# Patient Record
Sex: FEMALE | Race: WHITE | Employment: OTHER | URBAN - METROPOLITAN AREA
[De-identification: names, ages, dates, MRNs, and addresses within clinical notes are randomized per-mention and may not be internally consistent; named-entity substitution may affect disease eponyms.]

---

## 2018-09-05 ENCOUNTER — HOSPITAL ENCOUNTER (OUTPATIENT)
Dept: GENERAL RADIOLOGY | Age: 83
Discharge: HOME | End: 2018-09-05

## 2018-09-05 DIAGNOSIS — M17.12 ARTHRITIS OF KNEE, LEFT: ICD-10-CM

## 2019-08-13 ENCOUNTER — HOSPITAL ENCOUNTER (OUTPATIENT)
Dept: GENERAL RADIOLOGY | Age: 84
Discharge: HOME | End: 2019-08-13
Payer: MEDICARE

## 2019-08-13 ENCOUNTER — HOSPITAL ENCOUNTER (OUTPATIENT)
Age: 84
Discharge: HOME | End: 2019-08-13
Payer: MEDICARE

## 2019-08-13 DIAGNOSIS — M17.11 OSTEOARTHRITIS OF RIGHT KNEE, UNSPECIFIED OSTEOARTHRITIS TYPE: ICD-10-CM

## 2019-08-13 PROCEDURE — 73560 X-RAY EXAM OF KNEE 1 OR 2: CPT

## 2023-02-07 ENCOUNTER — HOSPITAL ENCOUNTER (OUTPATIENT)
Age: 88
Setting detail: SPECIMEN
Discharge: HOME | End: 2023-02-07
Payer: MEDICARE

## 2023-02-07 PROCEDURE — 81001 URINALYSIS AUTO W/SCOPE: CPT

## 2023-02-07 PROCEDURE — 87086 URINE CULTURE/COLONY COUNT: CPT

## 2023-02-08 LAB
BACTERIA: NEGATIVE /HPF
BILIRUBIN URINE: NEGATIVE MG/DL
BLOOD, URINE: NEGATIVE
CLARITY: CLEAR
COLOR: YELLOW
GLUCOSE, URINE: NEGATIVE MG/DL
HYALINE CASTS: 0 /LPF
KETONES, URINE: NEGATIVE MG/DL
LEUKOCYTE ESTERASE, URINE: ABNORMAL
MUCUS: ABNORMAL HPF
NITRITE URINE, QUANTITATIVE: NEGATIVE
PH, URINE: 6 (ref 5–8)
PROTEIN UA: NEGATIVE MG/DL
RBC URINE: 3 /HPF (ref 0–6)
SPECIFIC GRAVITY UA: 1.01 (ref 1–1.03)
SQUAMOUS EPITHELIAL: 6 /HPF
UROBILINOGEN, URINE: 0.2 MG/DL (ref 0.2–1)
WBC UA: 36 /HPF (ref 0–5)

## 2023-02-09 LAB
CULTURE: NORMAL
Lab: NORMAL
SPECIMEN: NORMAL

## 2023-02-27 PROBLEM — R91.8 LUNG MASS: Status: ACTIVE | Noted: 2023-02-27

## 2023-03-10 ENCOUNTER — HOSPITAL ENCOUNTER (OUTPATIENT)
Dept: PET IMAGING | Age: 88
Discharge: HOME | End: 2023-03-10
Payer: MEDICARE

## 2023-03-10 DIAGNOSIS — R91.1 LUNG NODULE: ICD-10-CM

## 2023-03-10 PROCEDURE — 3430000000 HC RX DIAGNOSTIC RADIOPHARMACEUTICAL: Performed by: INTERNAL MEDICINE

## 2023-03-10 PROCEDURE — 2580000003 HC RX 258: Performed by: INTERNAL MEDICINE

## 2023-03-10 PROCEDURE — A9552 F18 FDG: HCPCS | Performed by: INTERNAL MEDICINE

## 2023-03-10 PROCEDURE — 78815 PET IMAGE W/CT SKULL-THIGH: CPT

## 2023-03-10 RX ORDER — SODIUM CHLORIDE 0.9 % (FLUSH) 0.9 %
10 SYRINGE (ML) INJECTION PRN
Status: COMPLETED | OUTPATIENT
Start: 2023-03-10 | End: 2023-03-10

## 2023-03-10 RX ORDER — FLUDEOXYGLUCOSE F 18 200 MCI/ML
15.79 INJECTION, SOLUTION INTRAVENOUS
Status: COMPLETED | OUTPATIENT
Start: 2023-03-10 | End: 2023-03-10

## 2023-03-10 RX ADMIN — FLUDEOXYGLUCOSE F 18 15.79 MILLICURIE: 200 INJECTION, SOLUTION INTRAVENOUS at 12:42

## 2023-03-10 RX ADMIN — SODIUM CHLORIDE, PRESERVATIVE FREE 10 ML: 5 INJECTION INTRAVENOUS at 12:42

## 2023-08-21 ENCOUNTER — HOSPITAL ENCOUNTER (OUTPATIENT)
Dept: GENERAL RADIOLOGY | Age: 88
Discharge: HOME | End: 2023-08-21
Payer: MEDICARE

## 2023-08-21 ENCOUNTER — HOSPITAL ENCOUNTER (OUTPATIENT)
Age: 88
Discharge: HOME | End: 2023-08-21
Payer: MEDICARE

## 2023-08-21 DIAGNOSIS — R10.817 GENERALIZED ABDOMINAL TENDERNESS, REBOUND TENDERNESS PRESENCE NOT SPECIFIED: ICD-10-CM

## 2023-08-21 PROCEDURE — 74022 RADEX COMPL AQT ABD SERIES: CPT

## 2023-09-09 ENCOUNTER — APPOINTMENT (OUTPATIENT)
Dept: CT IMAGING | Age: 88
End: 2023-09-09
Payer: MEDICARE

## 2023-09-09 ENCOUNTER — HOSPITAL ENCOUNTER (EMERGENCY)
Age: 88
Discharge: HOME | End: 2023-09-09
Attending: EMERGENCY MEDICINE
Payer: MEDICARE

## 2023-09-09 VITALS
HEART RATE: 80 BPM | TEMPERATURE: 98.1 F | SYSTOLIC BLOOD PRESSURE: 153 MMHG | OXYGEN SATURATION: 96 % | DIASTOLIC BLOOD PRESSURE: 69 MMHG | RESPIRATION RATE: 18 BRPM

## 2023-09-09 DIAGNOSIS — N39.0 URINARY TRACT INFECTION WITHOUT HEMATURIA, SITE UNSPECIFIED: Primary | ICD-10-CM

## 2023-09-09 DIAGNOSIS — S22.088A OTHER CLOSED FRACTURE OF TWELFTH THORACIC VERTEBRA, INITIAL ENCOUNTER (HCC): ICD-10-CM

## 2023-09-09 LAB
ALBUMIN SERPL-MCNC: 4.4 GM/DL (ref 3.4–5)
ALP BLD-CCNC: 127 IU/L (ref 40–129)
ALT SERPL-CCNC: 7 U/L (ref 10–40)
ANION GAP SERPL CALCULATED.3IONS-SCNC: 12 MMOL/L (ref 4–16)
AST SERPL-CCNC: 16 IU/L (ref 15–37)
BACTERIA: ABNORMAL /HPF
BASOPHILS ABSOLUTE: 0.1 K/CU MM
BASOPHILS RELATIVE PERCENT: 0.8 % (ref 0–1)
BILIRUB SERPL-MCNC: 0.6 MG/DL (ref 0–1)
BILIRUBIN URINE: NEGATIVE MG/DL
BLOOD, URINE: NEGATIVE
BUN SERPL-MCNC: 21 MG/DL (ref 6–23)
CALCIUM SERPL-MCNC: 9.9 MG/DL (ref 8.3–10.6)
CAST TYPE: ABNORMAL /HPF
CHLORIDE BLD-SCNC: 101 MMOL/L (ref 99–110)
CLARITY: ABNORMAL
CO2: 25 MMOL/L (ref 21–32)
COLOR: YELLOW
CREAT SERPL-MCNC: 1 MG/DL (ref 0.6–1.1)
CRYSTAL TYPE: NEGATIVE /HPF
DIFFERENTIAL TYPE: ABNORMAL
EOSINOPHILS ABSOLUTE: 0.2 K/CU MM
EOSINOPHILS RELATIVE PERCENT: 2 % (ref 0–3)
EPITHELIAL CELLS, UA: 10 /HPF
GFR SERPL CREATININE-BSD FRML MDRD: 53 ML/MIN/1.73M2
GLUCOSE SERPL-MCNC: 131 MG/DL (ref 70–99)
GLUCOSE, URINE: NEGATIVE MG/DL
HCT VFR BLD CALC: 37.5 % (ref 37–47)
HEMOGLOBIN: 11.8 GM/DL (ref 12.5–16)
IMMATURE NEUTROPHIL %: 0.3 % (ref 0–0.43)
KETONES, URINE: NEGATIVE MG/DL
LACTATE: 1.2 MMOL/L (ref 0.5–1.9)
LEUKOCYTE ESTERASE, URINE: ABNORMAL
LYMPHOCYTES ABSOLUTE: 1.2 K/CU MM
LYMPHOCYTES RELATIVE PERCENT: 12.1 % (ref 24–44)
MCH RBC QN AUTO: 29.6 PG (ref 27–31)
MCHC RBC AUTO-ENTMCNC: 31.5 % (ref 32–36)
MCV RBC AUTO: 94.2 FL (ref 78–100)
MONOCYTES ABSOLUTE: 1.2 K/CU MM
MONOCYTES RELATIVE PERCENT: 12.2 % (ref 0–4)
NITRITE URINE, QUANTITATIVE: NEGATIVE
PDW BLD-RTO: 15.1 % (ref 11.7–14.9)
PH, URINE: 7.5 (ref 5–8)
PLATELET # BLD: 365 K/CU MM (ref 140–440)
PMV BLD AUTO: 11.8 FL (ref 7.5–11.1)
POTASSIUM SERPL-SCNC: 4.3 MMOL/L (ref 3.5–5.1)
PROTEIN UA: NEGATIVE MG/DL
RBC # BLD: 3.98 M/CU MM (ref 4.2–5.4)
RBC URINE: NEGATIVE /HPF (ref 0–6)
SEGMENTED NEUTROPHILS ABSOLUTE COUNT: 6.9 K/CU MM
SEGMENTED NEUTROPHILS RELATIVE PERCENT: 72.6 % (ref 36–66)
SODIUM BLD-SCNC: 138 MMOL/L (ref 135–145)
SPECIFIC GRAVITY UA: 1.01 (ref 1–1.03)
TOTAL IMMATURE NEUTOROPHIL: 0.03 K/CU MM
TOTAL PROTEIN: 8.1 GM/DL (ref 6.4–8.2)
UROBILINOGEN, URINE: 1 MG/DL (ref 0.2–1)
WBC # BLD: 9.5 K/CU MM (ref 4–10.5)
WBC UA: 25 /HPF (ref 0–5)

## 2023-09-09 PROCEDURE — 83605 ASSAY OF LACTIC ACID: CPT

## 2023-09-09 PROCEDURE — 74176 CT ABD & PELVIS W/O CONTRAST: CPT

## 2023-09-09 PROCEDURE — 6370000000 HC RX 637 (ALT 250 FOR IP): Performed by: EMERGENCY MEDICINE

## 2023-09-09 PROCEDURE — 87086 URINE CULTURE/COLONY COUNT: CPT

## 2023-09-09 PROCEDURE — 80053 COMPREHEN METABOLIC PANEL: CPT

## 2023-09-09 PROCEDURE — 85025 COMPLETE CBC W/AUTO DIFF WBC: CPT

## 2023-09-09 PROCEDURE — 99283 EMERGENCY DEPT VISIT LOW MDM: CPT

## 2023-09-09 PROCEDURE — 81001 URINALYSIS AUTO W/SCOPE: CPT

## 2023-09-09 RX ORDER — CEPHALEXIN 250 MG/1
500 CAPSULE ORAL ONCE
Status: COMPLETED | OUTPATIENT
Start: 2023-09-09 | End: 2023-09-09

## 2023-09-09 RX ORDER — CEPHALEXIN 500 MG/1
500 CAPSULE ORAL 2 TIMES DAILY
Qty: 14 CAPSULE | Refills: 0 | Status: SHIPPED | OUTPATIENT
Start: 2023-09-09 | End: 2023-09-12

## 2023-09-09 RX ORDER — ACETAMINOPHEN 500 MG
1000 TABLET ORAL ONCE
Status: COMPLETED | OUTPATIENT
Start: 2023-09-09 | End: 2023-09-09

## 2023-09-09 RX ADMIN — CEPHALEXIN 500 MG: 250 CAPSULE ORAL at 05:35

## 2023-09-09 RX ADMIN — ACETAMINOPHEN 1000 MG: 500 TABLET ORAL at 02:10

## 2023-09-09 ASSESSMENT — PAIN DESCRIPTION - ORIENTATION
ORIENTATION: LOWER
ORIENTATION: LOWER

## 2023-09-09 ASSESSMENT — PAIN - FUNCTIONAL ASSESSMENT
PAIN_FUNCTIONAL_ASSESSMENT: ACTIVITIES ARE NOT PREVENTED
PAIN_FUNCTIONAL_ASSESSMENT: 0-10
PAIN_FUNCTIONAL_ASSESSMENT: ACTIVITIES ARE NOT PREVENTED

## 2023-09-09 ASSESSMENT — PAIN DESCRIPTION - LOCATION
LOCATION: BACK
LOCATION: BACK

## 2023-09-09 NOTE — ED NOTES
PT awaiting CT results. Denies needs@ this time.       Viri Penny, BRITTNEY  09/09/23 9041 Started on Rocephin 1g IV q24h in ER  Will continue on admission

## 2023-09-09 NOTE — ED PROVIDER NOTES
Triage Chief Complaint:   Back Pain (Pt c/o back pain x 3 weeks)    Stebbins:  Diomedes Hernández is a 80 y.o. female that presents with left-sided back pain and urinary frequency arriving by EMS. The patient states over the past few weeks she has had some dull pain to her left lower back, worse with sitting and lying down and better with standing but pain is worsened over the last day and she has now developed urinary frequency. No dysuria or hematuria. She has not had fever, nausea, vomiting, diarrhea, abdominal pain or chest pain. States that she has had lower back pain for years but never this severe. ROS:  At least 10 systems reviewed and otherwise acutely negative except as in the [address] Past Medical History:   Diagnosis Date    Hypertension      History reviewed. No pertinent surgical history. History reviewed. No pertinent family history. Social History     Socioeconomic History    Marital status:       Spouse name: Not on file    Number of children: Not on file    Years of education: Not on file    Highest education level: Not on file   Occupational History    Not on file   Tobacco Use    Smoking status: Former     Packs/day: 1.00     Years: 50.00     Additional pack years: 0.00     Total pack years: 50.00     Types: Cigarettes     Quit date: 1997     Years since quittin.7    Smokeless tobacco: Never   Substance and Sexual Activity    Alcohol use: Not on file    Drug use: Not on file    Sexual activity: Not on file   Other Topics Concern    Not on file   Social History Narrative    Not on file     Social Determinants of Health     Financial Resource Strain: Not on file   Food Insecurity: Not on file   Transportation Needs: Not on file   Physical Activity: Not on file   Stress: Not on file   Social Connections: Not on file   Intimate Partner Violence: Not on file   Housing Stability: Not on file     Current Facility-Administered Medications   Medication Dose Route Frequency Provider Last

## 2023-09-10 LAB
CULTURE: NORMAL
Lab: NORMAL
SPECIMEN: NORMAL

## 2023-09-12 ENCOUNTER — TELEPHONE (OUTPATIENT)
Dept: PHARMACY | Age: 88
End: 2023-09-12

## 2023-09-12 NOTE — PROGRESS NOTES
Pharmacy Note  ED Culture Follow-up    Lana Humphrey is a 80 y.o. female. Allergies: Erythromycin, Influenza vaccines, Penicillins, and Sulfa antibiotics     Labs:  Lab Results   Component Value Date    BUN 21 09/09/2023    CREATININE 1.0 09/09/2023    WBC 9.5 09/09/2023     CrCl cannot be calculated (Unknown ideal weight.). Current antimicrobials:   Cephalexin 500 mg by mouth twice daily for 7 days     ASSESSMENT:  Micro results:   Urine culture <50,000 CFU/ml mixed skin/urogenital oneida     PLAN:  Need for intervention: Yes  Discussed with: Dr. Walter Hodge treatment:    Informed patient of negative urine culture and instructed patient to discontinue cephalexin. Patient response:   Called and spoke with patient. Counseled patient on following up with PCP    Called/sent in prescription to: Not applicable    Please call with any questions.  Meena Dunham [facility], PharmD 4:03 PM 9/12/2023

## 2023-09-12 NOTE — TELEPHONE ENCOUNTER
Pharmacy Note  ED Culture Follow-up    Brent Mcqueen is a 80 y.o. female. Allergies: Erythromycin, Influenza vaccines, Penicillins, and Sulfa antibiotics     Labs:  Lab Results   Component Value Date    BUN 21 09/09/2023    CREATININE 1.0 09/09/2023    WBC 9.5 09/09/2023     CrCl cannot be calculated (Unknown ideal weight.). Current antimicrobials:   Cephalexin 500 mg by mouth twice daily for 7 days     ASSESSMENT:  Micro results:   Urine culture <50,000 CFU/ml mixed skin/urogenital oneida     PLAN:  Need for intervention: Yes  Discussed with: Dr. Fito Torres treatment:    Informed patient of negative urine culture and instructed patient to discontinue cephalexin. Patient response:   Called and spoke with patient. Counseled patient on following up with PCP    Called/sent in prescription to: Not applicable    Please call with any questions.  Ya Harry [facility], PharmD 4:03 PM 9/12/2023

## 2023-09-18 ENCOUNTER — TRANSCRIBE ORDERS (OUTPATIENT)
Dept: ADMINISTRATIVE | Age: 88
End: 2023-09-18

## 2023-09-18 DIAGNOSIS — C34.91 MALIGNANT NEOPLASM OF RIGHT LUNG, UNSPECIFIED PART OF LUNG (HCC): Primary | ICD-10-CM

## 2023-09-28 ENCOUNTER — HOSPITAL ENCOUNTER (OUTPATIENT)
Dept: PET IMAGING | Age: 88
Discharge: HOME | End: 2023-09-28
Attending: INTERNAL MEDICINE
Payer: MEDICARE

## 2023-09-28 DIAGNOSIS — C34.91 MALIGNANT NEOPLASM OF RIGHT LUNG, UNSPECIFIED PART OF LUNG (HCC): ICD-10-CM

## 2023-09-28 PROCEDURE — 3430000000 HC RX DIAGNOSTIC RADIOPHARMACEUTICAL: Performed by: INTERNAL MEDICINE

## 2023-09-28 PROCEDURE — A9552 F18 FDG: HCPCS | Performed by: INTERNAL MEDICINE

## 2023-09-28 PROCEDURE — 2580000003 HC RX 258: Performed by: INTERNAL MEDICINE

## 2023-09-28 PROCEDURE — 78815 PET IMAGE W/CT SKULL-THIGH: CPT

## 2023-09-28 RX ORDER — SODIUM CHLORIDE 0.9 % (FLUSH) 0.9 %
10 SYRINGE (ML) INJECTION PRN
Status: COMPLETED | OUTPATIENT
Start: 2023-09-28 | End: 2023-09-28

## 2023-09-28 RX ORDER — FLUDEOXYGLUCOSE F 18 200 MCI/ML
15.22 INJECTION, SOLUTION INTRAVENOUS
Status: COMPLETED | OUTPATIENT
Start: 2023-09-28 | End: 2023-09-28

## 2023-09-28 RX ADMIN — FLUDEOXYGLUCOSE F 18 15.22 MILLICURIE: 200 INJECTION, SOLUTION INTRAVENOUS at 13:14

## 2023-09-28 RX ADMIN — SODIUM CHLORIDE, PRESERVATIVE FREE 10 ML: 5 INJECTION INTRAVENOUS at 13:14

## 2023-12-08 ENCOUNTER — APPOINTMENT (OUTPATIENT)
Dept: CT IMAGING | Age: 88
End: 2023-12-08
Payer: MEDICARE

## 2023-12-08 ENCOUNTER — APPOINTMENT (OUTPATIENT)
Dept: GENERAL RADIOLOGY | Age: 88
End: 2023-12-08
Payer: MEDICARE

## 2023-12-08 ENCOUNTER — HOSPITAL ENCOUNTER (INPATIENT)
Age: 88
LOS: 7 days | Discharge: OTHER INSTITUTION | End: 2023-12-15
Attending: STUDENT IN AN ORGANIZED HEALTH CARE EDUCATION/TRAINING PROGRAM | Admitting: STUDENT IN AN ORGANIZED HEALTH CARE EDUCATION/TRAINING PROGRAM
Payer: MEDICARE

## 2023-12-08 DIAGNOSIS — B88.8 INFESTATION BY BED BUG: ICD-10-CM

## 2023-12-08 DIAGNOSIS — R26.2 AMBULATORY DYSFUNCTION: ICD-10-CM

## 2023-12-08 DIAGNOSIS — R91.8 LUNG MASS: Primary | ICD-10-CM

## 2023-12-08 DIAGNOSIS — M84.58XG PATHOLOGICAL FRACTURE OF VERTEBRA DUE TO NEOPLASTIC DISEASE WITH DELAYED HEALING, SUBSEQUENT ENCOUNTER: ICD-10-CM

## 2023-12-08 PROBLEM — R53.1 GENERALIZED WEAKNESS: Status: ACTIVE | Noted: 2023-12-08

## 2023-12-08 LAB
ALBUMIN SERPL-MCNC: 3.7 GM/DL (ref 3.4–5)
ALP BLD-CCNC: 131 IU/L (ref 40–129)
ALT SERPL-CCNC: 9 U/L (ref 10–40)
ANION GAP SERPL CALCULATED.3IONS-SCNC: 14 MMOL/L (ref 4–16)
AST SERPL-CCNC: 22 IU/L (ref 15–37)
BASOPHILS ABSOLUTE: 0.1 K/CU MM
BASOPHILS RELATIVE PERCENT: 0.5 % (ref 0–1)
BILIRUB SERPL-MCNC: 0.8 MG/DL (ref 0–1)
BUN SERPL-MCNC: 28 MG/DL (ref 6–23)
CALCIUM SERPL-MCNC: 9.6 MG/DL (ref 8.3–10.6)
CHLORIDE BLD-SCNC: 100 MMOL/L (ref 99–110)
CO2: 25 MMOL/L (ref 21–32)
CREAT SERPL-MCNC: 1.1 MG/DL (ref 0.6–1.1)
DIFFERENTIAL TYPE: ABNORMAL
EOSINOPHILS ABSOLUTE: 0 K/CU MM
EOSINOPHILS RELATIVE PERCENT: 0.1 % (ref 0–3)
GFR SERPL CREATININE-BSD FRML MDRD: 47 ML/MIN/1.73M2
GLUCOSE SERPL-MCNC: 107 MG/DL (ref 70–99)
HCT VFR BLD CALC: 36.8 % (ref 37–47)
HEMOGLOBIN: 11.5 GM/DL (ref 12.5–16)
IMMATURE NEUTROPHIL %: 0.5 % (ref 0–0.43)
LYMPHOCYTES ABSOLUTE: 0.7 K/CU MM
LYMPHOCYTES RELATIVE PERCENT: 5 % (ref 24–44)
MCH RBC QN AUTO: 27.8 PG (ref 27–31)
MCHC RBC AUTO-ENTMCNC: 31.3 % (ref 32–36)
MCV RBC AUTO: 89.1 FL (ref 78–100)
MONOCYTES ABSOLUTE: 1.6 K/CU MM
MONOCYTES RELATIVE PERCENT: 11.2 % (ref 0–4)
NUCLEATED RBC %: 0 %
PDW BLD-RTO: 14.5 % (ref 11.7–14.9)
PLATELET # BLD: 360 K/CU MM (ref 140–440)
PMV BLD AUTO: 11.6 FL (ref 7.5–11.1)
POTASSIUM SERPL-SCNC: 3.5 MMOL/L (ref 3.5–5.1)
RBC # BLD: 4.13 M/CU MM (ref 4.2–5.4)
SEGMENTED NEUTROPHILS ABSOLUTE COUNT: 11.9 K/CU MM
SEGMENTED NEUTROPHILS RELATIVE PERCENT: 82.7 % (ref 36–66)
SODIUM BLD-SCNC: 139 MMOL/L (ref 135–145)
TOTAL IMMATURE NEUTOROPHIL: 0.07 K/CU MM
TOTAL NUCLEATED RBC: 0 K/CU MM
TOTAL PROTEIN: 7.3 GM/DL (ref 6.4–8.2)
WBC # BLD: 14.3 K/CU MM (ref 4–10.5)

## 2023-12-08 PROCEDURE — 6360000002 HC RX W HCPCS: Performed by: STUDENT IN AN ORGANIZED HEALTH CARE EDUCATION/TRAINING PROGRAM

## 2023-12-08 PROCEDURE — 1200000000 HC SEMI PRIVATE

## 2023-12-08 PROCEDURE — 6370000000 HC RX 637 (ALT 250 FOR IP): Performed by: STUDENT IN AN ORGANIZED HEALTH CARE EDUCATION/TRAINING PROGRAM

## 2023-12-08 PROCEDURE — 73562 X-RAY EXAM OF KNEE 3: CPT

## 2023-12-08 PROCEDURE — 70450 CT HEAD/BRAIN W/O DYE: CPT

## 2023-12-08 PROCEDURE — 85025 COMPLETE CBC W/AUTO DIFF WBC: CPT

## 2023-12-08 PROCEDURE — 72125 CT NECK SPINE W/O DYE: CPT

## 2023-12-08 PROCEDURE — 6360000004 HC RX CONTRAST MEDICATION: Performed by: STUDENT IN AN ORGANIZED HEALTH CARE EDUCATION/TRAINING PROGRAM

## 2023-12-08 PROCEDURE — 73502 X-RAY EXAM HIP UNI 2-3 VIEWS: CPT

## 2023-12-08 PROCEDURE — 80053 COMPREHEN METABOLIC PANEL: CPT

## 2023-12-08 PROCEDURE — 2580000003 HC RX 258: Performed by: STUDENT IN AN ORGANIZED HEALTH CARE EDUCATION/TRAINING PROGRAM

## 2023-12-08 PROCEDURE — 73610 X-RAY EXAM OF ANKLE: CPT

## 2023-12-08 PROCEDURE — 76376 3D RENDER W/INTRP POSTPROCES: CPT

## 2023-12-08 PROCEDURE — 99285 EMERGENCY DEPT VISIT HI MDM: CPT

## 2023-12-08 PROCEDURE — 71260 CT THORAX DX C+: CPT

## 2023-12-08 PROCEDURE — 94761 N-INVAS EAR/PLS OXIMETRY MLT: CPT

## 2023-12-08 RX ORDER — ONDANSETRON 4 MG/1
4 TABLET, ORALLY DISINTEGRATING ORAL EVERY 8 HOURS PRN
Status: DISCONTINUED | OUTPATIENT
Start: 2023-12-08 | End: 2023-12-15 | Stop reason: HOSPADM

## 2023-12-08 RX ORDER — POLYETHYLENE GLYCOL 3350 17 G/17G
17 POWDER, FOR SOLUTION ORAL DAILY PRN
Status: DISCONTINUED | OUTPATIENT
Start: 2023-12-08 | End: 2023-12-15 | Stop reason: HOSPADM

## 2023-12-08 RX ORDER — KETOROLAC TROMETHAMINE 15 MG/ML
15 INJECTION, SOLUTION INTRAMUSCULAR; INTRAVENOUS ONCE
Status: ACTIVE | OUTPATIENT
Start: 2023-12-08 | End: 2023-12-13

## 2023-12-08 RX ORDER — PROMETHAZINE HYDROCHLORIDE 12.5 MG/1
12.5 TABLET ORAL EVERY 12 HOURS PRN
COMMUNITY
Start: 2023-11-29

## 2023-12-08 RX ORDER — MORPHINE SULFATE 20 MG/ML
5 SOLUTION ORAL
Status: DISCONTINUED | OUTPATIENT
Start: 2023-12-08 | End: 2023-12-15 | Stop reason: HOSPADM

## 2023-12-08 RX ORDER — ASPIRIN 81 MG/1
81 TABLET, CHEWABLE ORAL DAILY
Status: DISCONTINUED | OUTPATIENT
Start: 2023-12-08 | End: 2023-12-15 | Stop reason: HOSPADM

## 2023-12-08 RX ORDER — SODIUM CHLORIDE 0.9 % (FLUSH) 0.9 %
5-40 SYRINGE (ML) INJECTION EVERY 12 HOURS SCHEDULED
Status: DISCONTINUED | OUTPATIENT
Start: 2023-12-08 | End: 2023-12-15 | Stop reason: HOSPADM

## 2023-12-08 RX ORDER — GLYCOPYRROLATE 0.2 MG/ML
0.2 INJECTION INTRAMUSCULAR; INTRAVENOUS EVERY 4 HOURS PRN
Status: DISCONTINUED | OUTPATIENT
Start: 2023-12-08 | End: 2023-12-15 | Stop reason: HOSPADM

## 2023-12-08 RX ORDER — ACETAMINOPHEN 650 MG/1
650 SUPPOSITORY RECTAL EVERY 6 HOURS PRN
Status: DISCONTINUED | OUTPATIENT
Start: 2023-12-08 | End: 2023-12-15 | Stop reason: HOSPADM

## 2023-12-08 RX ORDER — ONDANSETRON 2 MG/ML
4 INJECTION INTRAMUSCULAR; INTRAVENOUS EVERY 6 HOURS PRN
Status: DISCONTINUED | OUTPATIENT
Start: 2023-12-08 | End: 2023-12-08 | Stop reason: SDUPTHER

## 2023-12-08 RX ORDER — 0.9 % SODIUM CHLORIDE 0.9 %
1000 INTRAVENOUS SOLUTION INTRAVENOUS ONCE
Status: COMPLETED | OUTPATIENT
Start: 2023-12-08 | End: 2023-12-08

## 2023-12-08 RX ORDER — HYDROCODONE BITARTRATE AND ACETAMINOPHEN 5; 325 MG/1; MG/1
1 TABLET ORAL EVERY 6 HOURS PRN
Status: ON HOLD | COMMUNITY
Start: 2023-11-28 | End: 2023-12-11 | Stop reason: HOSPADM

## 2023-12-08 RX ORDER — SODIUM CHLORIDE 0.9 % (FLUSH) 0.9 %
5-40 SYRINGE (ML) INJECTION PRN
Status: DISCONTINUED | OUTPATIENT
Start: 2023-12-08 | End: 2023-12-15 | Stop reason: HOSPADM

## 2023-12-08 RX ORDER — AMLODIPINE BESYLATE 5 MG/1
5 TABLET ORAL DAILY
Status: DISCONTINUED | OUTPATIENT
Start: 2023-12-08 | End: 2023-12-15 | Stop reason: HOSPADM

## 2023-12-08 RX ORDER — ACETAMINOPHEN 325 MG/1
650 TABLET ORAL EVERY 6 HOURS PRN
Status: DISCONTINUED | OUTPATIENT
Start: 2023-12-08 | End: 2023-12-15 | Stop reason: HOSPADM

## 2023-12-08 RX ORDER — LORAZEPAM 2 MG/ML
1 CONCENTRATE ORAL
Status: DISCONTINUED | OUTPATIENT
Start: 2023-12-08 | End: 2023-12-15 | Stop reason: HOSPADM

## 2023-12-08 RX ORDER — FLUTICASONE PROPIONATE 50 MCG
2 SPRAY, SUSPENSION (ML) NASAL DAILY PRN
COMMUNITY
Start: 2023-09-11

## 2023-12-08 RX ORDER — SODIUM CHLORIDE 9 MG/ML
INJECTION, SOLUTION INTRAVENOUS PRN
Status: DISCONTINUED | OUTPATIENT
Start: 2023-12-08 | End: 2023-12-15 | Stop reason: HOSPADM

## 2023-12-08 RX ORDER — ENOXAPARIN SODIUM 100 MG/ML
30 INJECTION SUBCUTANEOUS DAILY
Status: DISCONTINUED | OUTPATIENT
Start: 2023-12-08 | End: 2023-12-15 | Stop reason: HOSPADM

## 2023-12-08 RX ORDER — ACETAMINOPHEN 325 MG/1
650 TABLET ORAL ONCE
Status: DISCONTINUED | OUTPATIENT
Start: 2023-12-08 | End: 2023-12-15 | Stop reason: HOSPADM

## 2023-12-08 RX ORDER — ACETAMINOPHEN 500 MG
1000 TABLET ORAL
Status: COMPLETED | OUTPATIENT
Start: 2023-12-08 | End: 2023-12-08

## 2023-12-08 RX ORDER — ONDANSETRON 2 MG/ML
4 INJECTION INTRAMUSCULAR; INTRAVENOUS EVERY 6 HOURS PRN
Status: DISCONTINUED | OUTPATIENT
Start: 2023-12-08 | End: 2023-12-15 | Stop reason: HOSPADM

## 2023-12-08 RX ORDER — POLYETHYLENE GLYCOL 3350 17 G/17G
17 POWDER, FOR SOLUTION ORAL DAILY PRN
Status: DISCONTINUED | OUTPATIENT
Start: 2023-12-08 | End: 2023-12-08 | Stop reason: SDUPTHER

## 2023-12-08 RX ADMIN — SODIUM CHLORIDE 1000 ML: 9 INJECTION, SOLUTION INTRAVENOUS at 13:47

## 2023-12-08 RX ADMIN — SODIUM CHLORIDE, PRESERVATIVE FREE 10 ML: 5 INJECTION INTRAVENOUS at 21:50

## 2023-12-08 RX ADMIN — ACETAMINOPHEN 1000 MG: 500 TABLET ORAL at 18:44

## 2023-12-08 RX ADMIN — ENOXAPARIN SODIUM 30 MG: 100 INJECTION SUBCUTANEOUS at 18:44

## 2023-12-08 RX ADMIN — AMLODIPINE BESYLATE 5 MG: 5 TABLET ORAL at 18:44

## 2023-12-08 RX ADMIN — IOPAMIDOL 75 ML: 755 INJECTION, SOLUTION INTRAVENOUS at 13:18

## 2023-12-08 RX ADMIN — Medication 1 MG: at 21:50

## 2023-12-08 RX ADMIN — ASPIRIN 81 MG: 81 TABLET, CHEWABLE ORAL at 18:44

## 2023-12-08 ASSESSMENT — PAIN DESCRIPTION - LOCATION
LOCATION: BACK

## 2023-12-08 ASSESSMENT — PAIN SCALES - GENERAL
PAINLEVEL_OUTOF10: 5
PAINLEVEL_OUTOF10: 8
PAINLEVEL_OUTOF10: 5
PAINLEVEL_OUTOF10: 5
PAINLEVEL_OUTOF10: 8

## 2023-12-08 ASSESSMENT — PAIN DESCRIPTION - DESCRIPTORS
DESCRIPTORS: ACHING

## 2023-12-08 ASSESSMENT — PAIN - FUNCTIONAL ASSESSMENT: PAIN_FUNCTIONAL_ASSESSMENT: ACTIVITIES ARE NOT PREVENTED

## 2023-12-08 ASSESSMENT — PAIN DESCRIPTION - ORIENTATION
ORIENTATION: LOWER

## 2023-12-08 ASSESSMENT — PAIN DESCRIPTION - PAIN TYPE: TYPE: CHRONIC PAIN

## 2023-12-08 NOTE — ED NOTES
Hibiclens bath performed at this time. Pt belongings bagged X3. Pt jewelry in a clear bag with slip dress.       Ghassan Bowman RN  12/08/23 [id number]

## 2023-12-08 NOTE — H&P
V2.0  History and Physical      Name:  Stephan Maldonado /Age/Sex: 1932  (80 y.o. female)   MRN & CSN:  [id number] & [id number] Encounter Date/Time: 2023 3:48 PM EST   Location:  ED27/ED-27 PCP: Walden Halsted, MD       Hospital Day: 1    Assessment and Plan:   Stephan Maldonado is a 80 y.o. female with a pmh of HTN who presents with Generalized weakness    Hospital Problems             Last Modified POA    * (Principal) Generalized weakness 2023 Yes       Generalized weakness with mechanical falls, recurrent: Multiple pathological fractures and osteopenia's: worsening pathological fractures likely secondary to the known lung cancer. Started on pain medications. Fall precautions in place PT OT for placement. Social work on board. Underlying history of lung cancer: Metastatic in nature. We discussed about CODE STATUS currently patient will be full code. Oncology has been consulted  Asymptomatic bacteriuria will hold off on antibiotics  Benign essential hypertension  Leukocytosis in the setting of above  Goals of care: Discussed in detail about the CODE STATUS. Initially admitted with full code. But the CODE STATUS after extensive discussion with the patient about the lung cancer and prognosis has been changed to DNR CC with comfort care. Hospice to be consulted. To be started on comfort care medications. Discussed management of the case in detail w/ the     Comment: Please note this report has been produced using speech recognition software and may contain errors related to that system including errors in grammar, punctuation, and spelling, as well as words and phrases that may be inappropriate. If there are any questions or concerns please feel free to contact the dictating provider for clarification.      Disposition:   Current Living situation: Home  Expected Disposition: Rehab  Estimated D/C: TBD    Diet No diet orders on file   DVT Prophylaxis [] Lovenox, []  Heparin, [] SCDs, [] acute osseous abnormality involving the left hip. If the patient is acutely nonweightbearing, consider CT or MRI for further evaluation.        Personally reviewed Lab Studies, Imaging    Electronically signed by Sean Bryant MD on 12/8/2023 at 3:48 PM

## 2023-12-08 NOTE — ED NOTES
Medication History  [facility]    Patient Name: Tammy Granger 7/25/1932     Medication history has been completed by: Iris Cabezas CPhT    Source(s) of information: insurance claims      Primary Care Physician: Martir Moya MD     Pharmacy: [facility] [place]    Allergies as of 12/08/2023 - Fully Reviewed 12/08/2023   Allergen Reaction Noted    Erythromycin  08/06/2016    Influenza vaccines  08/10/2022    Penicillins  08/06/2016    Sulfa antibiotics  08/06/2016        Prior to Admission medications    Medication Sig Start Date End Date Taking? Authorizing Provider   fluticasone (FLONASE) 50 MCG/ACT nasal spray 2 sprays by Nasal route daily as needed 9/11/23   Mansi Herrera MD   promethazine (PHENERGAN) 12.5 MG tablet Take 1 tablet by mouth every 12 hours as needed 11/29/23   Mansi Herrera MD   HYDROcodone-acetaminophen (NORCO) 5-325 MG per tablet Take 1 tablet by mouth every 6 hours as needed. Max Daily Amount: 4 tablets 11/28/23   Mansi Herrera MD   ALPRAZolam (XANAX) 0.25 MG tablet TAKE 1 TABLET BY MOUTH TWICE A DAY AS NEEDED FOR 30 DAYS 2/7/23   Mansi Herrera MD   amLODIPine (NORVASC) 5 MG tablet TAKE 1 TABLET BY MOUTH EVERY DAY FOR 90 DAYS 11/30/22   Mansi Herrera MD   aspirin 81 MG chewable tablet Take 81 mg by mouth daily    Mansi Herrera MD   primidone (MYSOLINE) 50 MG tablet Take 50 mg by mouth nightly    Mansi Herrera MD     Medications added or changed (ex. new medication, dosage change, interval change, formulation change):  Fluticasone spray (added)  Norco prn (added)  Promethazine prn (added)    Comments:  Unable to assess patient. Medication list per insurance claims.     To my knowledge the above medication history is accurate as of 12/8/2023 10:31 AM.   Iris Cabezas CPhT   12/8/2023 10:31 AM

## 2023-12-08 NOTE — ED PROVIDER NOTES
[facility]  Emergency Department Encounter    Pt Name:Jo Merlin Busman  MRN: [id number]  Birthdate 1932  Date of evaluation: 23  PCP:  Viviane Parada MD       [address]       Chief Complaint   Patient presents with    Fall     Fall. Was down for about an hour. Pt not on blood thinners. Pt states she has been feeling weak for the past 24 hours and that's why she fell. Pt does c/o numbness of toes. HISTORY OF PRESENT ILLNESS     Gavin Cabrera is a 80 y.o. female who presents with mechanical fall. Patient states that she went to bend over a few hours prior to arrival when she lost her balance causing her to fall backwards striking her head on the ground. She denies loss of consciousness. She is complaining of pain all over but most significantly in her back. She states that she has had a back fracture in the past.  Does have a history of hypertension and known lung mass. PAST MEDICAL / SURGICAL / SOCIAL / FAMILY HISTORY      has a past medical history of Hypertension. has no past surgical history on file. Social History     Socioeconomic History    Marital status:       Spouse name: Not on file    Number of children: Not on file    Years of education: Not on file    Highest education level: Not on file   Occupational History    Not on file   Tobacco Use    Smoking status: Former     Packs/day: 1.00     Years: 50.00     Additional pack years: 0.00     Total pack years: 50.00     Types: Cigarettes     Quit date: 1997     Years since quittin.9    Smokeless tobacco: Never   Substance and Sexual Activity    Alcohol use: Not on file    Drug use: Not on file    Sexual activity: Not on file   Other Topics Concern    Not on file   Social History Narrative    Not on file     Social Determinants of Health     Financial Resource Strain: Not on file   Food Insecurity: Not on file   Transportation Needs: Not on RADIOLOGY:  CT LUMBAR RECONSTRUCTION WO POST PROCESS   Final Result   1. Increased size of a spiculated right upper lobe pulmonary mass with   increased invasion of the right chest wall and worsening destructive changes   of the right 3rd and 4th ribs. 2.  Multifocal ground-glass opacities of the lungs are unchanged. 3.  Small right and trace left pleural effusions with adjacent atelectasis. 4.  A lucent lesion involving the left T11 vertebral body, pedicle, lamina,   and transverse processes concerning for metastatic disease. This appears   significantly more conspicuous since 09/28/2023 PET-CT. 5.  Increased compression deformity of the T12 vertebral body resulting in   approximately 20% height loss, consistent with a pathologic fracture. CT THORACIC RECONSTRUCTION WO POST PROCESS   Final Result   1. Increased size of a spiculated right upper lobe pulmonary mass with   increased invasion of the right chest wall and worsening destructive changes   of the right 3rd and 4th ribs. 2.  Multifocal ground-glass opacities of the lungs are unchanged. 3.  Small right and trace left pleural effusions with adjacent atelectasis. 4.  A lucent lesion involving the left T11 vertebral body, pedicle, lamina,   and transverse processes concerning for metastatic disease. This appears   significantly more conspicuous since 09/28/2023 PET-CT. 5.  Increased compression deformity of the T12 vertebral body resulting in   approximately 20% height loss, consistent with a pathologic fracture. CT CHEST ABDOMEN PELVIS W CONTRAST Additional Contrast? None   Final Result   1. Increased size of a spiculated right upper lobe pulmonary mass with   increased invasion of the right chest wall and worsening destructive changes   of the right 3rd and 4th ribs. 2.  Multifocal ground-glass opacities of the lungs are unchanged.       3.  Small right and trace left pleural effusions

## 2023-12-08 NOTE — ED NOTES
Pt speaking to son, Edis Durham, on hospital phone at this time.       Jamil Acosta RN  12/08/23 [id number]

## 2023-12-08 NOTE — PROGRESS NOTES
4 Eyes Skin Assessment     NAME:  Mickey Ramires  YOB: 1932  MEDICAL RECORD NUMBER:  [id number]    The patient is being assessed for  Admission    I agree that at least one RN has performed a thorough Head to Toe Skin Assessment on the patient. ALL assessment sites listed below have been assessed. Areas assessed by both nurses:    Head, Face, Ears, Shoulders, Back, Chest, Arms, Elbows, Hands, Sacrum. Buttock, Coccyx, Ischium, and Legs. Feet and Heels        Does the Patient have a Wound? No noted wound(s)       Jesus Manuel Prevention initiated by RN: Yes  Wound Care Orders initiated by RN: No    Pressure Injury (Stage 3,4, Unstageable, DTI, NWPT, and Complex wounds) if present, place Wound referral order by RN under : No    New Ostomies, if present place, Ostomy referral order under : No     Nurse 1 eSignature: Electronically signed by Madeleine Saldivar RN on 12/8/23 at 5:59 PM EST    **SHARE this note so that the co-signing nurse can place an eSignature**    Nurse 2 eSignature: Electronically signed by Kristin Yee on 12/8/23 at 6:00 PM EST

## 2023-12-09 LAB
ANION GAP SERPL CALCULATED.3IONS-SCNC: 13 MMOL/L (ref 4–16)
BACTERIA: NEGATIVE /HPF
BILIRUBIN URINE: NEGATIVE MG/DL
BLOOD, URINE: NEGATIVE
BUN SERPL-MCNC: 23 MG/DL (ref 6–23)
CALCIUM SERPL-MCNC: 9.2 MG/DL (ref 8.3–10.6)
CHLORIDE BLD-SCNC: 100 MMOL/L (ref 99–110)
CLARITY: CLEAR
CO2: 23 MMOL/L (ref 21–32)
COLOR: YELLOW
CREAT SERPL-MCNC: 0.9 MG/DL (ref 0.6–1.1)
FERRITIN: 173 NG/ML (ref 15–150)
FOLATE SERPL-MCNC: 4.9 NG/ML (ref 3.1–17.5)
GFR SERPL CREATININE-BSD FRML MDRD: >60 ML/MIN/1.73M2
GLUCOSE SERPL-MCNC: 100 MG/DL (ref 70–99)
GLUCOSE, URINE: NEGATIVE MG/DL
HCT VFR BLD CALC: 38.2 % (ref 37–47)
HEMOGLOBIN: 11.4 GM/DL (ref 12.5–16)
IRON: 22 UG/DL (ref 37–145)
KETONES, URINE: ABNORMAL MG/DL
LEUKOCYTE ESTERASE, URINE: NEGATIVE
MAGNESIUM: 2 MG/DL (ref 1.8–2.4)
MCH RBC QN AUTO: 28.1 PG (ref 27–31)
MCHC RBC AUTO-ENTMCNC: 29.8 % (ref 32–36)
MCV RBC AUTO: 94.1 FL (ref 78–100)
MUCUS: ABNORMAL HPF
NITRITE URINE, QUANTITATIVE: NEGATIVE
PCT TRANSFERRIN: 12 % (ref 10–44)
PDW BLD-RTO: 14.6 % (ref 11.7–14.9)
PH, URINE: 5.5 (ref 5–8)
PHOSPHORUS: 2.7 MG/DL (ref 2.5–4.9)
PLATELET # BLD: 370 K/CU MM (ref 140–440)
PMV BLD AUTO: 11.7 FL (ref 7.5–11.1)
POTASSIUM SERPL-SCNC: 3.4 MMOL/L (ref 3.5–5.1)
PROTEIN UA: ABNORMAL MG/DL
RBC # BLD: 4.06 M/CU MM (ref 4.2–5.4)
RBC URINE: <1 /HPF (ref 0–6)
SODIUM BLD-SCNC: 136 MMOL/L (ref 135–145)
SPECIFIC GRAVITY UA: 1.02 (ref 1–1.03)
SQUAMOUS EPITHELIAL: 1 /HPF
TOTAL IRON BINDING CAPACITY: 186 UG/DL (ref 250–450)
TRICHOMONAS: ABNORMAL /HPF
TSH SERPL DL<=0.005 MIU/L-ACNC: 2.17 UIU/ML (ref 0.27–4.2)
UNSATURATED IRON BINDING CAPACITY: 164 UG/DL (ref 110–370)
UROBILINOGEN, URINE: 0.2 MG/DL (ref 0.2–1)
VITAMIN B-12: >2000 PG/ML (ref 211–911)
WBC # BLD: 13.9 K/CU MM (ref 4–10.5)
WBC UA: 2 /HPF (ref 0–5)

## 2023-12-09 PROCEDURE — 83550 IRON BINDING TEST: CPT

## 2023-12-09 PROCEDURE — 99222 1ST HOSP IP/OBS MODERATE 55: CPT | Performed by: INTERNAL MEDICINE

## 2023-12-09 PROCEDURE — 36415 COLL VENOUS BLD VENIPUNCTURE: CPT

## 2023-12-09 PROCEDURE — 82728 ASSAY OF FERRITIN: CPT

## 2023-12-09 PROCEDURE — 80048 BASIC METABOLIC PNL TOTAL CA: CPT

## 2023-12-09 PROCEDURE — 2580000003 HC RX 258: Performed by: STUDENT IN AN ORGANIZED HEALTH CARE EDUCATION/TRAINING PROGRAM

## 2023-12-09 PROCEDURE — 1200000000 HC SEMI PRIVATE

## 2023-12-09 PROCEDURE — 97530 THERAPEUTIC ACTIVITIES: CPT

## 2023-12-09 PROCEDURE — 84443 ASSAY THYROID STIM HORMONE: CPT

## 2023-12-09 PROCEDURE — 83540 ASSAY OF IRON: CPT

## 2023-12-09 PROCEDURE — 82607 VITAMIN B-12: CPT

## 2023-12-09 PROCEDURE — 94761 N-INVAS EAR/PLS OXIMETRY MLT: CPT

## 2023-12-09 PROCEDURE — 6360000002 HC RX W HCPCS: Performed by: STUDENT IN AN ORGANIZED HEALTH CARE EDUCATION/TRAINING PROGRAM

## 2023-12-09 PROCEDURE — 81001 URINALYSIS AUTO W/SCOPE: CPT

## 2023-12-09 PROCEDURE — 97166 OT EVAL MOD COMPLEX 45 MIN: CPT

## 2023-12-09 PROCEDURE — 85027 COMPLETE CBC AUTOMATED: CPT

## 2023-12-09 PROCEDURE — 84100 ASSAY OF PHOSPHORUS: CPT

## 2023-12-09 PROCEDURE — 83735 ASSAY OF MAGNESIUM: CPT

## 2023-12-09 PROCEDURE — 82746 ASSAY OF FOLIC ACID SERUM: CPT

## 2023-12-09 PROCEDURE — 6370000000 HC RX 637 (ALT 250 FOR IP): Performed by: STUDENT IN AN ORGANIZED HEALTH CARE EDUCATION/TRAINING PROGRAM

## 2023-12-09 RX ORDER — POTASSIUM CHLORIDE 20 MEQ/1
40 TABLET, EXTENDED RELEASE ORAL ONCE
Status: COMPLETED | OUTPATIENT
Start: 2023-12-09 | End: 2023-12-09

## 2023-12-09 RX ADMIN — POTASSIUM CHLORIDE 40 MEQ: 1500 TABLET, EXTENDED RELEASE ORAL at 17:29

## 2023-12-09 RX ADMIN — ACETAMINOPHEN 650 MG: 325 TABLET ORAL at 17:29

## 2023-12-09 RX ADMIN — ENOXAPARIN SODIUM 30 MG: 100 INJECTION SUBCUTANEOUS at 09:16

## 2023-12-09 RX ADMIN — ASPIRIN 81 MG: 81 TABLET, CHEWABLE ORAL at 09:16

## 2023-12-09 RX ADMIN — SODIUM CHLORIDE, PRESERVATIVE FREE 10 ML: 5 INJECTION INTRAVENOUS at 22:01

## 2023-12-09 RX ADMIN — SODIUM CHLORIDE, PRESERVATIVE FREE 10 ML: 5 INJECTION INTRAVENOUS at 09:16

## 2023-12-09 RX ADMIN — AMLODIPINE BESYLATE 5 MG: 5 TABLET ORAL at 09:17

## 2023-12-09 ASSESSMENT — PAIN SCALES - GENERAL
PAINLEVEL_OUTOF10: 0
PAINLEVEL_OUTOF10: 6

## 2023-12-09 ASSESSMENT — PAIN DESCRIPTION - LOCATION: LOCATION: BACK

## 2023-12-09 NOTE — PROGRESS NOTES
OT, therapy POC and functional goals, progression w/ ADLs and transfers, importance of movement and OOB activity, d/c recommendations     Safety Measures: Gait belt used, Left in bed, Alarm in place    Assessment:  Pt is a 80 y o F admitted d/t generalized weakness. Pt at baseline is IND for ADLs, IND for high level IADLs, and IND for functional transfers/mobility w/o AD. Pt currently presents w/ deficits in ADL and high level IADL independence, functional ADL transfers, strength, and functional activity tolerance. Continued OT services recommended to increase safety and independence with ADL routine and to address remaining functional deficits. Pt would benefit from continued acute care OT services w/ discharge to SNF. Complexity: Moderate  Prognosis: Good, no significant barriers to participation at this time. Occupational Therapy Plan  Times Per Week: 3+       Goals:  Pt will complete all aspects of bed mobility for EOB/OOB ADLs w/ min A. Pt will complete UB ADLs w/ min A. Pt will complete LB ADLs w/ min A. Pt will complete all functional transfers to and from bed, chair, toilet, shower chair w/ min A. Pt will complete all aspects of toileting task w/ min A. Pt will perform therex/theract in order to increase strength and functional activity tolerance necessary for increased independence w/ ADL routine.     Pt goal: go home, get stronger  Time Frame for STGs: discharge    Equipment: Continue to assess at next LOC    Time:   Time in: 1058  Time out: 1119  Total time: 21  Timed treatment minutes: 11        Electronically signed by:      MISTY Roman/LINO  [id number]

## 2023-12-09 NOTE — PLAN OF CARE
Problem: Discharge Planning  Goal: Discharge to home or other facility with appropriate resources  12/9/2023 0920 by Hilario Neff RN  Outcome: Progressing  12/9/2023 0236 by Herminio Durham RN  Outcome: Progressing     Problem: Pain  Goal: Verbalizes/displays adequate comfort level or baseline comfort level  12/9/2023 0920 by Hilario Neff RN  Outcome: Progressing  12/9/2023 0236 by Herminio Durham RN  Outcome: Progressing     Problem: Skin/Tissue Integrity  Goal: Absence of new skin breakdown  Description: 1. Monitor for areas of redness and/or skin breakdown  2. Assess vascular access sites hourly  3. Every 4-6 hours minimum:  Change oxygen saturation probe site  4. Every 4-6 hours:  If on nasal continuous positive airway pressure, respiratory therapy assess nares and determine need for appliance change or resting period.   12/9/2023 0920 by Hilario Neff RN  Outcome: Progressing  12/9/2023 0236 by Herminio Durham RN  Outcome: Progressing     Problem: Safety - Adult  Goal: Free from fall injury  12/9/2023 0920 by Hilario Neff RN  Outcome: Progressing  12/9/2023 0236 by Herminio Durham RN  Outcome: Progressing     Problem: ABCDS Injury Assessment  Goal: Absence of physical injury  12/9/2023 0920 by Hilario Neff RN  Outcome: Progressing  12/9/2023 0236 by Herminio Durham RN  Outcome: Progressing

## 2023-12-09 NOTE — PROGRESS NOTES
V2.0  [facility] Hospitalist Progress Note      Name:  Camille Mederos /Age/Sex: 1932  (80 y.o. female)   MRN & CSN:  [id number] & [id number] Encounter Date/Time: 2023 10:53 AM EST    Location:  [facility] PCP: Henry Herron MD       Hospital Day: 2    Assessment and Plan:   Camille Mederos is a 80 y.o. female with a pmh of HTN who presents with Generalized weakness     Hospital Problems               Last Modified POA     * (Principal) Generalized weakness 2023 Yes         Generalized weakness with mechanical falls, recurrent: Multiple pathological fractures and osteopenia's: worsening pathological fractures likely secondary to the known lung cancer. Started on pain medications. Fall precautions in place PT OT for placement. Social work on board. Underlying history of lung cancer: Metastatic in nature. We discussed about CODE STATUS currently patient will be full code. Oncology has been consulted  Asymptomatic bacteriuria will hold off on antibiotics  Benign essential hypertension  Leukocytosis in the setting of above  Goals of care: Discussed in detail about the CODE STATUS. Initially admitted with full code. But the CODE STATUS after extensive discussion with the patient about the lung cancer and prognosis has been changed to DNR CC with comfort care. Hospice to be consulted. To be started on comfort care medications. Comment: Please note this report has been produced using speech recognition software and may contain errors related to that system including errors in grammar, punctuation, and spelling, as well as words and phrases that may be inappropriate. If there are any questions or concerns please feel free to contact the dictating provider for clarification. Diet ADULT DIET;  Regular   DVT Prophylaxis [] Lovenox, []  Heparin, [] SCDs, [] Ambulation,  [] Eliquis, [] Xarelto  [] Coumadin   Code Status DNR-CC   Disposition From: home  Expected Disposition: hospice  Estimated Date of no swelling. Reason for Exam: fall FINDINGS: Right knee: Mild osteoarthritis affects the right knee. There is chondrocalcinosis of menisci. No radiographic evidence of acute fracture or dislocation seen. Vascular calcification changes are seen. No significant joint effusion seen. Left knee:Mild osteoarthritis affects the left knee. No radiographic evidence of acute fracture or dislocation. Vascular calcification changes are seen. No significant joint effusion seen. The bones appear demineralized. Mild osteoarthritis affecting the bilateral knees. The bones appear demineralized without radiographic evidence of acute osseous abnormality of the knees seen. 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 XR KNEE RIGHT (3 VIEWS)    Result Date: 12/8/2023  EXAMINATION: THREE XRAY VIEWS OF THE RIGHT KNEE; THREE XRAY VIEWS OF THE LEFT KNEE 12/8/2023 12:24 pm COMPARISON: Right knee radiograph dated 08/13/2019. left knee radiograph dated 09/05/2018. HISTORY: ORDERING SYSTEM PROVIDED HISTORY: fall. pain all over. no swelling. TECHNOLOGIST PROVIDED HISTORY: Reason for exam:->fall. pain all over. no swelling. Reason for Exam: fall FINDINGS: Right knee: Mild osteoarthritis affects the right knee. There is chondrocalcinosis of menisci. No radiographic evidence of acute fracture or dislocation seen. Vascular calcification changes are seen. No significant joint effusion seen. Left knee:Mild osteoarthritis affects the left knee. No radiographic evidence of acute fracture or dislocation. Vascular calcification changes are seen. No significant joint effusion seen. The bones appear demineralized. Mild osteoarthritis affecting the bilateral knees. The bones appear demineralized without radiographic evidence of acute osseous abnormality of the knees seen. 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      XR HIP 2-3 VW W PELVIS RIGHT    Result Date: 12/8/2023  EXAMINATION: ONE XRAY VIEW OF THE PELVIS AND

## 2023-12-09 NOTE — CARE COORDINATION
LSW received consult for hospice. LSW reviewed chart and met with pt to initiate dc planning. Pt pleasant, appeared a/o x3. Pt reported PTA living indp in single story home with laundry in the basement. Pt reported being VERY indp amb w/o AD and driving to [employer] weekly. Pt reported receiving MOW with no other community services. Pt reported she is aware of terminal dx and just wants to be comfortable. Pt reported she realizes she cannot return home alone is in agreement to SNF. Pt reported having only one son located in [facility] and one granddaughter in [place], who is flying up later today. LSW discussed skilled care at SNF vs hospice care at SNF, as well as pvt pay obligations for room and board if she chooses to go with under hospice. LSW provided list of SNFs and hospice agencies at bedside. LSW obtained pt's permission to call her son, Archie Pink, to further discuss dc planning. LSW called son, Jose Angel Joshi ([phone]), to review dc plans. Theodore's wife was also present during phone call, who reported family would like for pt to transfer up near them in [facility] [facility] to [facility] for skilled care, then transition to hospice. Theodore confirmed pt's wishes for interest in comfort measures only at this time with no cancer tx. Theodore reported they have already been in contact with their , Maximilian Coronado, who is expecting a phone call. LSW called [facility] ([phone]) and [facility] for Irine Getting with referral requesting f/u Monday with LSW D Paola. Anticipated dc plan is [facility] Monday skilled w/o hospice care.   LSW did not make hospice referral at this time d/t to pt/family request.  Electronically signed by NOELLE Savage on 12/9/2023 at 3:26 PM

## 2023-12-10 LAB
ANION GAP SERPL CALCULATED.3IONS-SCNC: 13 MMOL/L (ref 4–16)
BUN SERPL-MCNC: 24 MG/DL (ref 6–23)
CALCIUM SERPL-MCNC: 8.9 MG/DL (ref 8.3–10.6)
CHLORIDE BLD-SCNC: 102 MMOL/L (ref 99–110)
CO2: 22 MMOL/L (ref 21–32)
CREAT SERPL-MCNC: 0.9 MG/DL (ref 0.6–1.1)
GFR SERPL CREATININE-BSD FRML MDRD: >60 ML/MIN/1.73M2
GLUCOSE SERPL-MCNC: 99 MG/DL (ref 70–99)
HCT VFR BLD CALC: 35 % (ref 37–47)
HEMOGLOBIN: 11 GM/DL (ref 12.5–16)
MAGNESIUM: 2 MG/DL (ref 1.8–2.4)
MCH RBC QN AUTO: 28.1 PG (ref 27–31)
MCHC RBC AUTO-ENTMCNC: 31.4 % (ref 32–36)
MCV RBC AUTO: 89.5 FL (ref 78–100)
PDW BLD-RTO: 14.7 % (ref 11.7–14.9)
PHOSPHORUS: 2.5 MG/DL (ref 2.5–4.9)
PLATELET # BLD: 438 K/CU MM (ref 140–440)
PMV BLD AUTO: 11.6 FL (ref 7.5–11.1)
POTASSIUM SERPL-SCNC: 4.2 MMOL/L (ref 3.5–5.1)
RBC # BLD: 3.91 M/CU MM (ref 4.2–5.4)
SODIUM BLD-SCNC: 137 MMOL/L (ref 135–145)
WBC # BLD: 13.3 K/CU MM (ref 4–10.5)

## 2023-12-10 PROCEDURE — 36415 COLL VENOUS BLD VENIPUNCTURE: CPT

## 2023-12-10 PROCEDURE — 85027 COMPLETE CBC AUTOMATED: CPT

## 2023-12-10 PROCEDURE — 6370000000 HC RX 637 (ALT 250 FOR IP): Performed by: STUDENT IN AN ORGANIZED HEALTH CARE EDUCATION/TRAINING PROGRAM

## 2023-12-10 PROCEDURE — 97163 PT EVAL HIGH COMPLEX 45 MIN: CPT

## 2023-12-10 PROCEDURE — 1200000000 HC SEMI PRIVATE

## 2023-12-10 PROCEDURE — 97530 THERAPEUTIC ACTIVITIES: CPT

## 2023-12-10 PROCEDURE — 94761 N-INVAS EAR/PLS OXIMETRY MLT: CPT

## 2023-12-10 PROCEDURE — 80048 BASIC METABOLIC PNL TOTAL CA: CPT

## 2023-12-10 PROCEDURE — 6360000002 HC RX W HCPCS: Performed by: STUDENT IN AN ORGANIZED HEALTH CARE EDUCATION/TRAINING PROGRAM

## 2023-12-10 PROCEDURE — 2580000003 HC RX 258: Performed by: STUDENT IN AN ORGANIZED HEALTH CARE EDUCATION/TRAINING PROGRAM

## 2023-12-10 PROCEDURE — 83735 ASSAY OF MAGNESIUM: CPT

## 2023-12-10 PROCEDURE — 84100 ASSAY OF PHOSPHORUS: CPT

## 2023-12-10 RX ADMIN — ACETAMINOPHEN 650 MG: 325 TABLET ORAL at 22:55

## 2023-12-10 RX ADMIN — ASPIRIN 81 MG: 81 TABLET, CHEWABLE ORAL at 09:37

## 2023-12-10 RX ADMIN — SODIUM CHLORIDE, PRESERVATIVE FREE 10 ML: 5 INJECTION INTRAVENOUS at 22:48

## 2023-12-10 RX ADMIN — SODIUM CHLORIDE, PRESERVATIVE FREE 10 ML: 5 INJECTION INTRAVENOUS at 09:38

## 2023-12-10 RX ADMIN — AMLODIPINE BESYLATE 5 MG: 5 TABLET ORAL at 09:37

## 2023-12-10 RX ADMIN — ENOXAPARIN SODIUM 30 MG: 100 INJECTION SUBCUTANEOUS at 09:37

## 2023-12-10 ASSESSMENT — PAIN DESCRIPTION - PAIN TYPE: TYPE: CHRONIC PAIN;ACUTE PAIN

## 2023-12-10 ASSESSMENT — PAIN DESCRIPTION - DESCRIPTORS: DESCRIPTORS: ACHING

## 2023-12-10 ASSESSMENT — PAIN SCALES - GENERAL: PAINLEVEL_OUTOF10: 3

## 2023-12-10 ASSESSMENT — PAIN DESCRIPTION - LOCATION: LOCATION: GENERALIZED

## 2023-12-10 ASSESSMENT — PAIN - FUNCTIONAL ASSESSMENT: PAIN_FUNCTIONAL_ASSESSMENT: ACTIVITIES ARE NOT PREVENTED

## 2023-12-10 NOTE — PROGRESS NOTES
Pt bp 132/28 (58) pt alert and oriented and denies any sxs at this time. Hospitalist notified of bp no new orders at this time.

## 2023-12-10 NOTE — PROGRESS NOTES
left pleural effusions and adjacent atelectasis. Bones: Increased destruction of the right 3rd and 4th ribs as above. ABDOMEN/PELVIS: Organs: The liver is unremarkable. Prior cholecystectomy. The spleen and pancreas are unremarkable. Nonspecific thickening of the bilateral adrenal glands without discrete nodule. Left renal cysts. No hydronephrosis or renal calculi. GI/Bowel: No evidence of bowel obstruction or findings to suggest bowel injury. Colonic diverticulosis without evidence of acute diverticulitis. Moderate colonic stool burden. Pelvis: The bladder is moderately distended. Suspected posterior bladder diverticuli. Prior hysterectomy. Peritoneum/Retroperitoneum: No retroperitoneal, mesenteric, or pelvic lymphadenopathy. No free fluid or pneumoperitoneum. Moderate atherosclerosis of the abdominal aorta and iliac arteries without aneurysm. Bones/Soft Tissues: No acute soft tissue abnormality. Tiny fat containing umbilical hernia. THORACOLUMBAR SPINE: BONES/ALIGNMENT: There is a lucent lesion involving the left T11 vertebral body, pedicle, lamina, and transverse process concerning for metastatic disease. This is significantly more conspicuous since the prior examination. Sclerotic changes noted throughout the T11 vertebral body are also suspicious for metastatic disease. Increased compression deformity of the T12 vertebral body resulting in approximately 20% height loss. No significant retropulsion into the spinal canal.  No definite correlate is identified for the focus of radiotracer activity involving the T3 vertebral body on prior PET-CT. Levoconvex curvature of the lumbar spine. DEGENERATIVE CHANGES: Moderate thoracic spondylosis. SOFT TISSUES: No definitive extension of the T11 and T12 lesions into the spinal canal, although evaluation is limited.      1.  Increased size of a spiculated right upper lobe pulmonary mass with increased invasion of the right chest wall and worsening destructive changes of the right 3rd and 4th ribs. 2.  Multifocal ground-glass opacities of the lungs are unchanged. 3.  Small right and trace left pleural effusions with adjacent atelectasis. 4.  A lucent lesion involving the left T11 vertebral body, pedicle, lamina, and transverse processes concerning for metastatic disease. This appears significantly more conspicuous since 09/28/2023 PET-CT. 5.  Increased compression deformity of the T12 vertebral body resulting in approximately 20% height loss, consistent with a pathologic fracture. CT LUMBAR RECONSTRUCTION WO POST PROCESS    Result Date: 12/8/2023  EXAMINATION: CT OF THE CHEST, ABDOMEN, AND PELVIS WITH CONTRAST; CT OF THE THORACIC SPINE WITHOUT CONTRAST; CT OF THE LUMBAR SPINE WITHOUT CONTRAST 12/8/2023 11:51 am TECHNIQUE: CT of the chest, abdomen and pelvis was performed with the administration of intravenous contrast. Multiplanar reformatted images are provided for review. Automated exposure control, iterative reconstruction, and/or weight based adjustment of the mA/kV was utilized to reduce the radiation dose to as low as reasonably achievable.; CT of the thoracic spine was performed without the administration of intravenous contrast. Multiplanar reformatted images are provided for review. Automated exposure control, iterative reconstruction, and/or weight based adjustment of the mA/kV was utilized to reduce the radiation dose to as low as reasonably achievable.; CT of the lumbar spine was performed without the administration of intravenous contrast. Multiplanar reformatted images are provided for review. Adjustment of mA and/or kV according to patient size was utilized. Automated exposure control, iterative reconstruction, and/or weight based adjustment of the mA/kV was utilized to reduce the radiation dose to as low as reasonably achievable.  COMPARISON: 09/28/2023 PET-CT HISTORY: ORDERING SYSTEM PROVIDED HISTORY: fall, hurts all over TECHNOLOGIST PROVIDED

## 2023-12-10 NOTE — CONSULTS
[facility] ACUTE CARE PHYSICAL THERAPY EVALUATION  Baljinder Jane, 7/25/1932, [id number]/3659-B, 12/10/2023    Assessment:  Patient admitted for gen weakness in setting of lung cancer with unstable/unpredictable medical features and unstable/unpredictable rehabilitative features. Rapid and significant recent functional decline from full indep just 3 weeks ago. Limited scope of eval d/t significant pain with attempted mobility. Uncertain about ability to tolerate therapy services. Patient discussed plan for hospice care, therapist feels that is certainly worth consideration -- significant functional decline rapidly in recent past.  Encourage ideal pain management, could consider additional diagnostic bone tests as recommended by radiologist because mobility is limited by severe pain. There are no significant educational impairments or barriers to learning which prevent participation with service. Prognosis: poor. Clinical decision making:  high complexity:  hx 3+ personal factors/comorbidities, exam tests and measures for 3+ elements of body/structures/functions/activity limitation/participation restriction, unstable/unpredictable presentation. Discharge recommendations:  any setting which can provide dependent care through assisted care. Does not appear to need a rehabilitative stay for therapy at this time d/t medical status. Plan/Goals:  Eval and hold. Mobility homework for patient and nurse:    change position frequently. Patient requires moderate assist   for mobility with nursing. History  [place]:  The primary encounter diagnosis was Lung mass. 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 Patient  has a past medical history of Hypertension. Patient  has no past surgical history on file.   Restrictions:  fall risk and contact Activity Training:   Therapeutic activity training was instructed today. Cues were given for safety, sequence, UE/LE placement, awareness, and balance. Activities performed today included bed mobility training, attempt sup-sit    Time in:  1035  Time out:  1055  Timed treatment minutes:  10  Total treatment time:  20    Electronically signed by:   Kassandra Beaver PT  12/10/2023, 10:43 AM

## 2023-12-10 NOTE — CARE COORDINATION
LEENAW met with pt, son/Theodore and granddaughter per request.  Son wanted to confirm the dc plan is [facility] under Pt A benefit going skilled with NO hospice services at this time. Pt in agreement with dc plan. Son declined desire to speak to hospice rep at this time.   Electronically signed by NOELLE Pruett on 12/10/2023 at 2:58 PM

## 2023-12-11 LAB
ANION GAP SERPL CALCULATED.3IONS-SCNC: 14 MMOL/L (ref 4–16)
BUN SERPL-MCNC: 27 MG/DL (ref 6–23)
CALCIUM SERPL-MCNC: 8.8 MG/DL (ref 8.3–10.6)
CHLORIDE BLD-SCNC: 101 MMOL/L (ref 99–110)
CO2: 23 MMOL/L (ref 21–32)
CREAT SERPL-MCNC: 0.9 MG/DL (ref 0.6–1.1)
GFR SERPL CREATININE-BSD FRML MDRD: >60 ML/MIN/1.73M2
GLUCOSE SERPL-MCNC: 104 MG/DL (ref 70–99)
HCT VFR BLD CALC: 33.9 % (ref 37–47)
HEMOGLOBIN: 10.5 GM/DL (ref 12.5–16)
MAGNESIUM: 1.9 MG/DL (ref 1.8–2.4)
MCH RBC QN AUTO: 27.8 PG (ref 27–31)
MCHC RBC AUTO-ENTMCNC: 31 % (ref 32–36)
MCV RBC AUTO: 89.7 FL (ref 78–100)
PDW BLD-RTO: 14.6 % (ref 11.7–14.9)
PHOSPHORUS: 2.9 MG/DL (ref 2.5–4.9)
PLATELET # BLD: 433 K/CU MM (ref 140–440)
PMV BLD AUTO: 11.8 FL (ref 7.5–11.1)
POTASSIUM SERPL-SCNC: 4 MMOL/L (ref 3.5–5.1)
RBC # BLD: 3.78 M/CU MM (ref 4.2–5.4)
SODIUM BLD-SCNC: 138 MMOL/L (ref 135–145)
WBC # BLD: 10.5 K/CU MM (ref 4–10.5)

## 2023-12-11 PROCEDURE — 36415 COLL VENOUS BLD VENIPUNCTURE: CPT

## 2023-12-11 PROCEDURE — 85027 COMPLETE CBC AUTOMATED: CPT

## 2023-12-11 PROCEDURE — 6370000000 HC RX 637 (ALT 250 FOR IP): Performed by: STUDENT IN AN ORGANIZED HEALTH CARE EDUCATION/TRAINING PROGRAM

## 2023-12-11 PROCEDURE — 84100 ASSAY OF PHOSPHORUS: CPT

## 2023-12-11 PROCEDURE — 1200000000 HC SEMI PRIVATE

## 2023-12-11 PROCEDURE — 80048 BASIC METABOLIC PNL TOTAL CA: CPT

## 2023-12-11 PROCEDURE — 2580000003 HC RX 258: Performed by: STUDENT IN AN ORGANIZED HEALTH CARE EDUCATION/TRAINING PROGRAM

## 2023-12-11 PROCEDURE — 83735 ASSAY OF MAGNESIUM: CPT

## 2023-12-11 PROCEDURE — 6360000002 HC RX W HCPCS: Performed by: STUDENT IN AN ORGANIZED HEALTH CARE EDUCATION/TRAINING PROGRAM

## 2023-12-11 PROCEDURE — 94761 N-INVAS EAR/PLS OXIMETRY MLT: CPT

## 2023-12-11 RX ORDER — HYDROMORPHONE HYDROCHLORIDE 2 MG/1
2 TABLET ORAL EVERY 12 HOURS PRN
Qty: 6 TABLET | Refills: 0 | Status: SHIPPED | OUTPATIENT
Start: 2023-12-11 | End: 2023-12-14

## 2023-12-11 RX ADMIN — AMLODIPINE BESYLATE 5 MG: 5 TABLET ORAL at 09:10

## 2023-12-11 RX ADMIN — ASPIRIN 81 MG: 81 TABLET, CHEWABLE ORAL at 09:10

## 2023-12-11 RX ADMIN — SODIUM CHLORIDE, PRESERVATIVE FREE 10 ML: 5 INJECTION INTRAVENOUS at 19:25

## 2023-12-11 RX ADMIN — ACETAMINOPHEN 650 MG: 325 TABLET ORAL at 15:56

## 2023-12-11 RX ADMIN — ACETAMINOPHEN 650 MG: 325 TABLET ORAL at 22:37

## 2023-12-11 RX ADMIN — ENOXAPARIN SODIUM 30 MG: 100 INJECTION SUBCUTANEOUS at 09:10

## 2023-12-11 ASSESSMENT — PAIN DESCRIPTION - LOCATION: LOCATION: LEG

## 2023-12-11 ASSESSMENT — PAIN DESCRIPTION - DESCRIPTORS: DESCRIPTORS: ACHING

## 2023-12-11 ASSESSMENT — PAIN SCALES - GENERAL: PAINLEVEL_OUTOF10: 9

## 2023-12-11 ASSESSMENT — PAIN DESCRIPTION - ORIENTATION: ORIENTATION: RIGHT;LEFT

## 2023-12-11 NOTE — CARE COORDINATION
CM in to see Pt to follow up on discharge planning. Plan remains [facility]. CM contacted by Pt son Paieg Warner and daughter in law Margo Darnell who also remain agreeable to [facility].     CM call to [facility] [phone], referral given to [facility] [address] [place] [address] information faxed to [phone]

## 2023-12-11 NOTE — PROGRESS NOTES
V2.0  [facility] [place] [place] Hospitalist Progress Note      Name:  Chitra Abreu /Age/Sex: 1932  (80 y.o. female)   MRN & CSN:  [id number] & [id number] Encounter Date/Time: 2023 10:53 AM EST    Location:  8329/1013-E PCP: Paola Jiang MD       Hospital Day: 4    Assessment and Plan:   Chitra Abreu is a 80 y.o. female with a pmh of HTN who presents with Generalized weakness     Hospital Problems               Last Modified POA     * (Principal) Generalized weakness 2023 Yes         Generalized weakness with mechanical falls, recurrent: Multiple pathological fractures and osteopenia's: worsening pathological fractures likely secondary to the known lung cancer. Started on pain medications. Fall precautions in place PT OT for placement. Social work on board. Communicated with family does not want hospice. Patient agrees with that now. Pending placement to SNF. Placement pending  Underlying history of lung cancer: Metastatic in nature. We discussed about CODE STATUS currently patient will be full code. Oncology has been consulted  Asymptomatic bacteriuria will hold off on antibiotics  Benign essential hypertension  Leukocytosis in the setting of above  Goals of care: Discussed in detail about the CODE STATUS. Initially admitted with full code. But the CODE STATUS after extensive discussion with the patient about the lung cancer and prognosis has been changed to DNR CC with comfort care. Hospice to be consulted. To be started on comfort care medications. Comment: Please note this report has been produced using speech recognition software and may contain errors related to that system including errors in grammar, punctuation, and spelling, as well as words and phrases that may be inappropriate. If there are any questions or concerns please feel free to contact the dictating provider for clarification. Diet ADULT DIET;  Regular; GI [place] (GERD/Peptic Ulcer); dislikes [employer]elfino food   DVT Prophylaxis []

## 2023-12-12 PROBLEM — E44.0 MODERATE MALNUTRITION (HCC): Status: ACTIVE | Noted: 2023-12-12

## 2023-12-12 PROCEDURE — 1200000000 HC SEMI PRIVATE

## 2023-12-12 PROCEDURE — 94761 N-INVAS EAR/PLS OXIMETRY MLT: CPT

## 2023-12-12 PROCEDURE — 6360000002 HC RX W HCPCS: Performed by: STUDENT IN AN ORGANIZED HEALTH CARE EDUCATION/TRAINING PROGRAM

## 2023-12-12 PROCEDURE — 6370000000 HC RX 637 (ALT 250 FOR IP): Performed by: STUDENT IN AN ORGANIZED HEALTH CARE EDUCATION/TRAINING PROGRAM

## 2023-12-12 PROCEDURE — 2580000003 HC RX 258: Performed by: STUDENT IN AN ORGANIZED HEALTH CARE EDUCATION/TRAINING PROGRAM

## 2023-12-12 RX ADMIN — AMLODIPINE BESYLATE 5 MG: 5 TABLET ORAL at 09:13

## 2023-12-12 RX ADMIN — ACETAMINOPHEN 650 MG: 325 TABLET ORAL at 09:18

## 2023-12-12 RX ADMIN — ASPIRIN 81 MG: 81 TABLET, CHEWABLE ORAL at 09:13

## 2023-12-12 RX ADMIN — ACETAMINOPHEN 650 MG: 325 TABLET ORAL at 20:36

## 2023-12-12 RX ADMIN — SODIUM CHLORIDE, PRESERVATIVE FREE 10 ML: 5 INJECTION INTRAVENOUS at 09:19

## 2023-12-12 RX ADMIN — ENOXAPARIN SODIUM 30 MG: 100 INJECTION SUBCUTANEOUS at 09:12

## 2023-12-12 RX ADMIN — ONDANSETRON 4 MG: 2 INJECTION INTRAMUSCULAR; INTRAVENOUS at 09:18

## 2023-12-12 RX ADMIN — SODIUM CHLORIDE, PRESERVATIVE FREE 10 ML: 5 INJECTION INTRAVENOUS at 20:37

## 2023-12-12 ASSESSMENT — PAIN DESCRIPTION - LOCATION
LOCATION: BACK
LOCATION: BACK

## 2023-12-12 ASSESSMENT — PAIN DESCRIPTION - DESCRIPTORS
DESCRIPTORS: ACHING;THROBBING
DESCRIPTORS: ACHING

## 2023-12-12 ASSESSMENT — PAIN DESCRIPTION - ORIENTATION
ORIENTATION: LOWER
ORIENTATION: RIGHT;LEFT

## 2023-12-12 ASSESSMENT — PAIN - FUNCTIONAL ASSESSMENT: PAIN_FUNCTIONAL_ASSESSMENT: ACTIVITIES ARE NOT PREVENTED

## 2023-12-12 ASSESSMENT — PAIN SCALES - GENERAL
PAINLEVEL_OUTOF10: 7
PAINLEVEL_OUTOF10: 2
PAINLEVEL_OUTOF10: 5
PAINLEVEL_OUTOF10: 3

## 2023-12-12 ASSESSMENT — PAIN DESCRIPTION - PAIN TYPE: TYPE: ACUTE PAIN

## 2023-12-12 NOTE — PROGRESS NOTES
Comprehensive Nutrition Assessment    Type and Reason for Visit:  Initial (low BMI)    Nutrition Recommendations/Plan:   Continue current oral diet  Offer standard oral nutrition supplement BID  Monitor weights, po intakes, labs, POC     Malnutrition Assessment:  Malnutrition Status: Moderate malnutrition (12/12/23 0948)    Context:  Chronic Illness     Findings of the 6 clinical characteristics of malnutrition:  Energy Intake:  75% or less estimated energy requirements for 1 month or longer  Weight Loss:  Unable to assess     Body Fat Loss:  Mild body fat loss Buccal region   Muscle Mass Loss:  Mild muscle mass loss Hand (interosseous), Temples (temporalis), Clavicles (pectoralis & deltoids)  Fluid Accumulation:  No significant fluid accumulation     Strength:  Not Performed    Nutrition Assessment:    Admitted w/ generalized weakness, multiple recent falls, h/o HTN, lung cancer (not on tx). Pt reports eating less than usual, states that her food gets cold due to constantly receiving care. Denies trouble chewing/swallowing. Feeds self. She also noted not eating as well as she used to 2mo ago. Reports UBW of 138# around 1 month or so ago, has lost weight. Limited wt hx per chart, was 139# in March, a 13% loss x9mo; however CBW is stated. NFPE performed, did note mild wastig, meets criteria for malnutrition. Pt willing to try standard oral supp BID. Noted some discussion of possible hospice care but family would like pt to d/c to SNF. Pt not planning on pursuing aggressive cancer tx. Will follow at high risk at this time. Nutrition Related Findings:    Meds, labs reviewed Wound Type: None       Current Nutrition Intake & Therapies:    Average Meal Intake: Unable to assess  Average Supplements Intake: None Ordered  ADULT DIET;  Regular; GI [place] (GERD/Peptic Ulcer); dislikes [employer] food    Anthropometric Measures:  Height: 162.6 cm (5' 4\")  Ideal Body Weight (IBW): 120 lbs (55 kg)    Admission Body Weight: 55 kg (121 lb 4.1 oz)  Current Body Weight: 55 kg (121 lb 4.1 oz),   IBW.  Weight Source: Stated  Current BMI (kg/m2): 20.8  Usual Body Weight: 63 kg (139 lb) (March 2023)  % Weight Change (Calculated): -12.8  Weight Adjustment For: No Adjustment                 BMI Categories: Underweight (BMI less than 22) age over 72    Estimated Daily Nutrient Needs:  Energy Requirements Based On: Kcal/kg  Weight Used for Energy Requirements: Current  Energy (kcal/day): 3411-1472 (30-35kcal/kg)  Weight Used for Protein Requirements: Ideal  Protein (g/day): 66-77 (1.2-1.4g/kg IBW)  Method Used for Fluid Requirements: 1 ml/kcal  Fluid (ml/day): 1800    Nutrition Diagnosis:   Moderate malnutrition, In context of chronic illness related to inadequate protein-energy intake, pain, catabolic illness as evidenced by poor intake prior to admission, weight loss, mild muscle loss, Criteria as identified in malnutrition assessment    Nutrition Interventions:   Food and/or Nutrient Delivery: Continue Current Diet, Start Oral Nutrition Supplement  Nutrition Education/Counseling: No recommendation at this time  Coordination of Nutrition Care: Continue to monitor while inpatient, Coordination of Care  Plan of Care discussed with: ptMD Connelly re: malnutrition diagnosis    Goals:     Goals: PO intake 50% or greater, by next RD assessment       Nutrition Monitoring and Evaluation:   Behavioral-Environmental Outcomes: None Identified  Food/Nutrient Intake Outcomes: Food and Nutrient Intake, Supplement Intake  Physical Signs/Symptoms Outcomes: Weight, Biochemical Data, Nutrition Focused Physical Findings, Meal Time Behavior, GI Status    Discharge Planning:    Continue current diet, Continue Oral Nutrition Supplement     Sung Phelps, [address]: [id number]

## 2023-12-12 NOTE — CARE COORDINATION
CM call to [facility] to follow up on referral, left  for return call.     4:03 PM   CM updated by [facility], they are unable to accept Pt at this time due to bed availability and recent Covid outbreak in the building. CM spoke with Pt daughter in law Sarah Perrin and Pt. Plan remains SNF. New SNF choice [facility] [address] [place] [address]. CM faxed Pt information to [phone].   Referral given to [facility] [phone]

## 2023-12-12 NOTE — PLAN OF CARE
Problem: Discharge Planning  Goal: Discharge to home or other facility with appropriate resources  12/12/2023 1237 by Brennan Flynn RN  Outcome: Progressing  12/12/2023 0012 by Martin Bedoya RN  Outcome: Progressing     Problem: Pain  Goal: Verbalizes/displays adequate comfort level or baseline comfort level  12/12/2023 1237 by Brennan Flynn RN  Outcome: Progressing  12/12/2023 0012 by Martin Bedoya RN  Outcome: Progressing     Problem: Skin/Tissue Integrity  Goal: Absence of new skin breakdown  Description: 1. Monitor for areas of redness and/or skin breakdown  2. Assess vascular access sites hourly  3. Every 4-6 hours minimum:  Change oxygen saturation probe site  4. Every 4-6 hours:  If on nasal continuous positive airway pressure, respiratory therapy assess nares and determine need for appliance change or resting period.   12/12/2023 1237 by Brennan Flynn RN  Outcome: Progressing  12/12/2023 0012 by Martin Bedoya RN  Outcome: Progressing     Problem: Safety - Adult  Goal: Free from fall injury  12/12/2023 1237 by Brennan Flynn RN  Outcome: Progressing  12/12/2023 0012 by Martin Bedoya RN  Outcome: Progressing     Problem: ABCDS Injury Assessment  Goal: Absence of physical injury  12/12/2023 1237 by Brennan Flynn RN  Outcome: Progressing  12/12/2023 0012 by Martin Bedoya RN  Outcome: Progressing     Problem: Nutrition Deficit:  Goal: Optimize nutritional status  Outcome: Progressing

## 2023-12-12 NOTE — PROGRESS NOTES
V2.0  [facility] – [place] Hospitalist Progress Note      Name:  Tammy Granger /Age/Sex: 1932  (80 y.o. female)   MRN & CSN:  [id number] & [id number] Encounter Date/Time: 2023 10:53 AM EST    Location:  [facility]/2644-V PCP: Martir Moya MD       Hospital Day: 5    Assessment and Plan:   Tammy Granger is a 80 y.o. female with a pmh of HTN who presents with Generalized weakness     Hospital Problems               Last Modified POA     * (Principal) Generalized weakness 2023 Yes         Generalized weakness with mechanical falls, recurrent: Multiple pathological fractures and osteopenia's: worsening pathological fractures likely secondary to the known lung cancer. Started on pain medications. Fall precautions in place PT OT for placement. Social work on board. Communicated with family does not want hospice. Patient agrees with that now. Pending placement to SNF. Underlying history of lung cancer: Metastatic in nature. We discussed about CODE STATUS currently patient will be full code. Oncology has been consulted  Asymptomatic bacteriuria will hold off on antibiotics  Benign essential hypertension  Leukocytosis in the setting of above    Comment: Please note this report has been produced using speech recognition software and may contain errors related to that system including errors in grammar, punctuation, and spelling, as well as words and phrases that may be inappropriate. If there are any questions or concerns please feel free to contact the dictating provider for clarification. Diet ADULT DIET;  Regular; GI [place] (GERD/Peptic Ulcer); dislikes [employer] food  ADULT ORAL NUTRITION SUPPLEMENT; Breakfast, Dinner; Standard High Calorie/High Protein Oral Supplement   DVT Prophylaxis [] Lovenox, []  Heparin, [] SCDs, [] Ambulation,  [] Eliquis, [] Xarelto  [] Coumadin   Code Status DNR-CC   Disposition From: home  Expected Disposition: SNF  Estimated Date of Discharge: pending placement  Patient requires COMPARISON: None. HISTORY: ORDERING SYSTEM PROVIDED HISTORY: fall. pain all over. no swelling. TECHNOLOGIST PROVIDED HISTORY: Reason for exam:->fall. pain all over. no swelling. Reason for Exam: fall FINDINGS: There is normal alignment of the left ankle. There is no acute fracture or dislocation identified. The ankle mortise is normally aligned. No acute osseous abnormality of the left ankle evident. XR ANKLE RIGHT (MIN 3 VIEWS)    Result Date: 12/8/2023  EXAMINATION: THREE XRAY VIEWS OF THE RIGHT ANKLE 12/8/2023 12:24 pm COMPARISON: None. HISTORY: ORDERING SYSTEM PROVIDED HISTORY: fall. pain all over. no swelling. TECHNOLOGIST PROVIDED HISTORY: Reason for exam:->fall. pain all over. no swelling. Reason for Exam: fall FINDINGS: No radiographic evidence of acute fracture or dislocation seen. The talar dome appears intact. Vascular calcification changes are seen. No radiopaque foreign body seen. No radiographic evidence of acute osseous abnormality of the right ankle seen. XR KNEE LEFT (3 VIEWS)    Result Date: 12/8/2023  EXAMINATION: THREE XRAY VIEWS OF THE RIGHT KNEE; THREE XRAY VIEWS OF THE LEFT KNEE 12/8/2023 12:24 pm COMPARISON: Right knee radiograph dated 08/13/2019. left knee radiograph dated 09/05/2018. HISTORY: ORDERING SYSTEM PROVIDED HISTORY: fall. pain all over. no swelling. TECHNOLOGIST PROVIDED HISTORY: Reason for exam:->fall. pain all over. no swelling. Reason for Exam: fall FINDINGS: Right knee: Mild osteoarthritis affects the right knee. There is chondrocalcinosis of menisci. No radiographic evidence of acute fracture or dislocation seen. Vascular calcification changes are seen. No significant joint effusion seen. Left knee:Mild osteoarthritis affects the left knee. No radiographic evidence of acute fracture or dislocation. Vascular calcification changes are seen. No significant joint effusion seen. The bones appear demineralized.      Mild osteoarthritis affecting the

## 2023-12-12 NOTE — PROGRESS NOTES
Occupational Therapy  Occupational therapy attempted to see pt today for treatment. Pt in bed with friends visiting. Provided education to the pt regarding the importance of therapy for discharge planning and functional independence. Pt verbalized understanding but continued to defer occupational therapy session with stated reasons of pain in back. Will re-attempt treatment at a later time.

## 2023-12-13 PROCEDURE — 99231 SBSQ HOSP IP/OBS SF/LOW 25: CPT | Performed by: PHYSICIAN ASSISTANT

## 2023-12-13 PROCEDURE — 94761 N-INVAS EAR/PLS OXIMETRY MLT: CPT

## 2023-12-13 PROCEDURE — 6370000000 HC RX 637 (ALT 250 FOR IP): Performed by: STUDENT IN AN ORGANIZED HEALTH CARE EDUCATION/TRAINING PROGRAM

## 2023-12-13 PROCEDURE — 2580000003 HC RX 258: Performed by: STUDENT IN AN ORGANIZED HEALTH CARE EDUCATION/TRAINING PROGRAM

## 2023-12-13 PROCEDURE — 1200000000 HC SEMI PRIVATE

## 2023-12-13 PROCEDURE — 6360000002 HC RX W HCPCS: Performed by: STUDENT IN AN ORGANIZED HEALTH CARE EDUCATION/TRAINING PROGRAM

## 2023-12-13 RX ADMIN — ENOXAPARIN SODIUM 30 MG: 100 INJECTION SUBCUTANEOUS at 09:13

## 2023-12-13 RX ADMIN — SODIUM CHLORIDE, PRESERVATIVE FREE 10 ML: 5 INJECTION INTRAVENOUS at 09:13

## 2023-12-13 RX ADMIN — SODIUM CHLORIDE, PRESERVATIVE FREE 10 ML: 5 INJECTION INTRAVENOUS at 23:35

## 2023-12-13 RX ADMIN — AMLODIPINE BESYLATE 5 MG: 5 TABLET ORAL at 09:13

## 2023-12-13 RX ADMIN — ASPIRIN 81 MG: 81 TABLET, CHEWABLE ORAL at 09:13

## 2023-12-13 RX ADMIN — ACETAMINOPHEN 650 MG: 325 TABLET ORAL at 19:42

## 2023-12-13 ASSESSMENT — PAIN DESCRIPTION - LOCATION: LOCATION: BACK

## 2023-12-13 ASSESSMENT — PAIN SCALES - GENERAL: PAINLEVEL_OUTOF10: 6

## 2023-12-13 NOTE — CARE COORDINATION
CM call to [facility] to check on status of referral, left VM for return call     12:28 PM   CM contacted by [facility], they are reviewing for possible admission at this time. 4:41 PM   CM contacted by [facility], they are unable to accept the Pt at this time due to bed availability. [facility] states they have numerous Pt's appealing their discharges. CM spoke to Pt daughter in law Jenni and son Alfredo. Plan remains SNF near them. Referral made back to [facility] for assistance in a sister facility. Updated Pt information faxed to [phone]     Pt updated and remains agreeable to SNF placement near her son, no preference of facility.      CM following

## 2023-12-13 NOTE — PROGRESS NOTES
Physician Progress Note      PATIENT:               Nydia Grissom  CSN #:                  [id number]  :                       1932  ADMIT DATE:       2023 9:38 AM  DISCH DATE:  RESPONDING  PROVIDER #:        Daniel Oconnor MD          QUERY TEXT:    Internal Medicine,    Patient admitted with  weakness, multiple fx r/t CA and has moderate   malnutrition documented by the Dietitian. If possible, please document in   progress notes and discharge summary if you are evaluating and /or treating   any of the following: The medical record reflects the following:  Risk Factors: CA  Clinical Indicators: BMI 16, Malnutrition Status: Moderate malnutrition,  75%   or less estimated energy requirements for 1 month or longer, Mild body fat   loss and muscle mass loss  Treatment: labs, I&O, weight, ONS    Thank you,  Sophie Jeffrey RN CDS  [phone]    ASPEN Criteria:    https://aspenjournals. onlinelibrary. locke. com/doi/full/10.1177/878577935363182  5  Options provided:  -- Moderate malnutrition  -- Other - I will add my own diagnosis  -- Disagree - Not applicable / Not valid  -- Disagree - Clinically unable to determine / Unknown  -- Refer to Clinical Documentation Reviewer    PROVIDER RESPONSE TEXT:    This patient has moderate malnutrition.     Query created by: Sophie Jeffrey on 2023 5:26 PM      Electronically signed by:  Daniel Oconnor MD 2023 7:18 AM

## 2023-12-13 NOTE — PROGRESS NOTES
HEMATOLOGY ONCOLOGY PROGRESS NOTE    Patient Name:  Gavin Cabrera  Patient :  1932  Patient MRN:  [id number]     Referring Provider: Viviane Parada MD     Date of Service: 2023      Reason for Consult: lung mass     Chief Complaint:    Chief Complaint   Patient presents with    Fall     Fall. Was down for about an hour. Pt not on blood thinners. Pt states she has been feeling weak for the past 24 hours and that's why she fell. Pt does c/o numbness of toes. Patient Active Problem List:     Lung mass     Generalized weakness    HPI:   Gavin Cabrera is a pleasant [address] y.o. female who presented to ED on 2023 due to generalized weakness and fall. She has been followed by Dr Tony Graham for right lung nodule and refused biopsy d/t her age. 2023 PET scan:  1. Disease progression with increasing metabolic activity associated with the right upper lobe mass and evidence of new local invasion of the chest wall. 2.  New focal activity and multiple thoracic vertebrae concerning for developing metastatic disease, including at T12 where there is a fracture identified on the recent CT scan. 2023 CT head: No acute intracranial abnormality. Findings compatible with chronic  microvascular ischemic disease and involutional change. 2023 CT CAP:  1. Increased size of a spiculated right upper lobe pulmonary mass with  increased invasion of the right chest wall and worsening destructive changes  of the right 3rd and 4th ribs. 2.  Multifocal ground-glass opacities of the lungs are unchanged. 3.  Small right and trace left pleural effusions with adjacent atelectasis. 4.  A lucent lesion involving the left T11 vertebral body, pedicle, lamina, and transverse processes concerning for metastatic disease. This appears  significantly more conspicuous since 2023 PET-CT.    5.  Increased compression deformity of the T12 vertebral body resulting in  approximately 20% height loss,

## 2023-12-14 PROCEDURE — 1200000000 HC SEMI PRIVATE

## 2023-12-14 PROCEDURE — 6370000000 HC RX 637 (ALT 250 FOR IP): Performed by: STUDENT IN AN ORGANIZED HEALTH CARE EDUCATION/TRAINING PROGRAM

## 2023-12-14 PROCEDURE — 97164 PT RE-EVAL EST PLAN CARE: CPT

## 2023-12-14 PROCEDURE — 94761 N-INVAS EAR/PLS OXIMETRY MLT: CPT

## 2023-12-14 PROCEDURE — 97530 THERAPEUTIC ACTIVITIES: CPT

## 2023-12-14 PROCEDURE — 2580000003 HC RX 258: Performed by: STUDENT IN AN ORGANIZED HEALTH CARE EDUCATION/TRAINING PROGRAM

## 2023-12-14 PROCEDURE — 6360000002 HC RX W HCPCS: Performed by: STUDENT IN AN ORGANIZED HEALTH CARE EDUCATION/TRAINING PROGRAM

## 2023-12-14 RX ADMIN — ENOXAPARIN SODIUM 30 MG: 100 INJECTION SUBCUTANEOUS at 09:28

## 2023-12-14 RX ADMIN — AMLODIPINE BESYLATE 5 MG: 5 TABLET ORAL at 09:28

## 2023-12-14 RX ADMIN — ASPIRIN 81 MG: 81 TABLET, CHEWABLE ORAL at 09:28

## 2023-12-14 RX ADMIN — SODIUM CHLORIDE, PRESERVATIVE FREE 10 ML: 5 INJECTION INTRAVENOUS at 09:28

## 2023-12-14 RX ADMIN — SODIUM CHLORIDE, PRESERVATIVE FREE 10 ML: 5 INJECTION INTRAVENOUS at 20:52

## 2023-12-14 RX ADMIN — ACETAMINOPHEN 650 MG: 325 TABLET ORAL at 19:40

## 2023-12-14 ASSESSMENT — PAIN DESCRIPTION - ORIENTATION
ORIENTATION: MID

## 2023-12-14 ASSESSMENT — PAIN SCALES - GENERAL
PAINLEVEL_OUTOF10: 6
PAINLEVEL_OUTOF10: 6
PAINLEVEL_OUTOF10: 5

## 2023-12-14 ASSESSMENT — PAIN DESCRIPTION - ONSET
ONSET: ON-GOING

## 2023-12-14 ASSESSMENT — PAIN DESCRIPTION - LOCATION
LOCATION: BACK

## 2023-12-14 ASSESSMENT — PAIN - FUNCTIONAL ASSESSMENT
PAIN_FUNCTIONAL_ASSESSMENT: PREVENTS OR INTERFERES SOME ACTIVE ACTIVITIES AND ADLS
PAIN_FUNCTIONAL_ASSESSMENT: ACTIVITIES ARE NOT PREVENTED
PAIN_FUNCTIONAL_ASSESSMENT: ACTIVITIES ARE NOT PREVENTED

## 2023-12-14 ASSESSMENT — PAIN DESCRIPTION - DESCRIPTORS
DESCRIPTORS: ACHING

## 2023-12-14 ASSESSMENT — PAIN DESCRIPTION - PAIN TYPE
TYPE: CHRONIC PAIN

## 2023-12-14 ASSESSMENT — PAIN DESCRIPTION - FREQUENCY
FREQUENCY: INTERMITTENT

## 2023-12-14 NOTE — PROGRESS NOTES
Occupational Therapy  Attempted to see pt at 11:30 AM with pt refusing in-bed or EOB activity despite encouragement and education on benefits. Pt reports she is in too much pain and doesn't need therapy at this time. Will continue to follow as able and appropriate.      Eulis Cabot COTA/L

## 2023-12-14 NOTE — CARE COORDINATION
CM in to see Pt to follow up on discharge planning. Plan remains SNF. Referral now made to [facility]. CM call to central intake [phone] referral given to [facility]. Pt information faxed to [phone]    3:44 PM   CM contacted by central intake. Updated therapy notes needed for approval.  Whiteboard placed.     CM following

## 2023-12-14 NOTE — CONSULTS
Physical Therapy Reeval and Treatment Note  Name: Diomedes Hernández MRN: [id number] :   1932   Date:  2023   Admission Date: 2023 Room:  [address] [place] [address]-A   Restrictions/Precautions:          n/a  Communication with other providers:  d/w Jacki Katz  Subjective:  Patient states:  agreeable  to tx. Unable to move at [address] [place] [address] today, seems worse than before. Trying, but just cannot move well. Pain:   Location, Type, Intensity (0/10 to 10/10):  none at rest, 6/10 with rolling  Objective:    Observation:  Alert, oriented, pleasant, participatory. Patient with good awareness and insight into functional deficits. Objective Measures:  mod A rolling in bed. UNable to recruit at hips and knee for anti-gravity AROM. UNable to recruit at hips and knees for bed mobility. UNable to create AROM at right ankle toes, ABLE to create partial AROM LLE ankle/toes. BUE WFL AROM and functionally appropriate power. UNable to achieve EOB because of neuromotor impairment BLE, despite willingness. Treatment, including education/measures:  Therapeutic Activity Training:   Therapeutic activity training was instructed today. Cues were given for safety, sequence, UE/LE placement, awareness, and balance. Activities performed today included bed mobility training  Assessment / Impression:    Tries to participate, but has worsening BLE neuromotor function which prevents success. No changes to previous PT d/c recs -- needs placement with dependent care capable facility. Do not anticipate functional gains in setting of her complex/significant medical features. Patient's tolerance of treatment:  fair   Adverse Reaction: none  Significant change in status and impact:  WORSENED neuromotor BLE function  Barriers to improvement:  none  Plan for Next Session:    Return to therapy hold.     Time in:  1350  Time out:  1410  Timed treatment minutes:  10  Total treatment time:  20    Previously filed items:  Social/Functional

## 2023-12-14 NOTE — PROGRESS NOTES
V2.0  [facility]ist Progress Note      Name:  Jorge Newton /Age/Sex: 1932  (80 y.o. female)   MRN & CSN:  [id number] & [id number] Encounter Date/Time: 2023 10:53 AM EST    Location:  8395/7099-J PCP: Coco Stauffer MD       Hospital Day: 7    Assessment and Plan:   Jorge Newton is a 80 y.o. female with a pmh of HTN who presents with Generalized weakness     Hospital Problems               Last Modified POA     * (Principal) Generalized weakness 2023 Yes         Generalized weakness with mechanical falls, recurrent: Multiple pathological fractures and osteopenia's: worsening pathological fractures likely secondary to the known lung cancer. Started on pain medications. Fall precautions in place PT OT for placement. Social work on board. Communicated with family does not want hospice. Pending placement to SNF. Underlying history of lung cancer: Metastatic in nature. We discussed about CODE STATUS currently patient will be DNR CC  Asymptomatic bacteriuria will hold off on antibiotics  Benign essential hypertension  Leukocytosis in the setting of above    Diet ADULT DIET; Regular; GI Costilla (GERD/Peptic Ulcer); dislikes Delfino food  ADULT ORAL NUTRITION SUPPLEMENT; Breakfast, Dinner; Standard High Calorie/High Protein Oral Supplement   DVT Prophylaxis [] Lovenox, []  Heparin, [] SCDs, [] Ambulation,  [] Eliquis, [] Xarelto  [] Coumadin   Code Status DNR-CC   Disposition From: home  Expected Disposition: SNF  Estimated Date of Discharge: pending placement  Patient requires continued admission due to pending placement   Surrogate Decision Maker/ POA      Subjective:     Chief Complaint: Fall (Fall. Was down for about an hour. Pt not on blood thinners. Pt states she has been feeling weak for the past 24 hours and that's why she fell. Pt does c/o numbness of toes.   )     The patient was seen at the bedside.   Doing well denies any active complaints, c/o occasional back pain of the mA/kV was utilized to reduce the radiation dose to as low as reasonably achievable. COMPARISON: 09/28/2023 PET-CT HISTORY: ORDERING SYSTEM PROVIDED HISTORY: fall, hurts all over TECHNOLOGIST PROVIDED HISTORY: Reason for exam:->fall, hurts all over Additional Contrast?->None Decision Support Exception - unselect if not a suspected or confirmed emergency medical condition->Emergency Medical Condition (MA) Reason for Exam: fall, hurts all over FINDINGS: CHEST: Chest Wall and Thoracic Inlet: The right upper lobe mass described below demonstrates increased invasion of the right chest wall. No axillary or supraclavicular lymphadenopathy. The thyroid gland is unremarkable. Mediastinum and Leona: No mediastinal or hilar lymphadenopathy. Moderate atherosclerosis of the thoracic aorta without aneurysm. The heart is normal in size. Coronary artery calcifications. No pericardial effusion. Lungs/Pleura: Increased size of a spiculated right upper lobe mass measuring 4.1 x 2.9 cm (series 10, image 47), previously 3.1 x 2.1 cm. There is significantly increased invasion of the right chest wall with destructive changes of the right 3rd and 4th ribs. Redemonstrated pleural based nodule adjacent to the right anterior 4th rib. Multifocal ground-glass opacities in the upper lobes are unchanged. Redemonstrated biapical partially calcified pleural-parenchymal scarring. Moderate emphysematous changes of the lung. Small right and trace left pleural effusions and adjacent atelectasis. Bones: Increased destruction of the right 3rd and 4th ribs as above. ABDOMEN/PELVIS: Organs: The liver is unremarkable. Prior cholecystectomy. The spleen and pancreas are unremarkable. Nonspecific thickening of the bilateral adrenal glands without discrete nodule. Left renal cysts. No hydronephrosis or renal calculi. GI/Bowel: No evidence of bowel obstruction or findings to suggest bowel injury.   Colonic diverticulosis without evidence of

## 2023-12-15 VITALS
OXYGEN SATURATION: 95 % | HEIGHT: 64 IN | BODY MASS INDEX: 23.05 KG/M2 | HEART RATE: 81 BPM | DIASTOLIC BLOOD PRESSURE: 53 MMHG | RESPIRATION RATE: 18 BRPM | WEIGHT: 135 LBS | SYSTOLIC BLOOD PRESSURE: 128 MMHG | TEMPERATURE: 98.4 F

## 2023-12-15 PROCEDURE — 97530 THERAPEUTIC ACTIVITIES: CPT

## 2023-12-15 PROCEDURE — 6370000000 HC RX 637 (ALT 250 FOR IP): Performed by: STUDENT IN AN ORGANIZED HEALTH CARE EDUCATION/TRAINING PROGRAM

## 2023-12-15 PROCEDURE — 94761 N-INVAS EAR/PLS OXIMETRY MLT: CPT

## 2023-12-15 PROCEDURE — 2580000003 HC RX 258: Performed by: STUDENT IN AN ORGANIZED HEALTH CARE EDUCATION/TRAINING PROGRAM

## 2023-12-15 PROCEDURE — 6360000002 HC RX W HCPCS: Performed by: STUDENT IN AN ORGANIZED HEALTH CARE EDUCATION/TRAINING PROGRAM

## 2023-12-15 RX ORDER — OXYCODONE HYDROCHLORIDE AND ACETAMINOPHEN 5; 325 MG/1; MG/1
1 TABLET ORAL EVERY 4 HOURS PRN
Qty: 18 TABLET | Refills: 0 | Status: SHIPPED | OUTPATIENT
Start: 2023-12-15 | End: 2023-12-18

## 2023-12-15 RX ORDER — OXYCODONE HYDROCHLORIDE AND ACETAMINOPHEN 5; 325 MG/1; MG/1
1 TABLET ORAL EVERY 4 HOURS PRN
Status: DISCONTINUED | OUTPATIENT
Start: 2023-12-15 | End: 2023-12-15 | Stop reason: HOSPADM

## 2023-12-15 RX ORDER — ALPRAZOLAM 0.25 MG/1
0.25 TABLET ORAL 2 TIMES DAILY PRN
Qty: 6 TABLET | Refills: 0 | Status: SHIPPED | OUTPATIENT
Start: 2023-12-15 | End: 2023-12-18

## 2023-12-15 RX ADMIN — SODIUM CHLORIDE, PRESERVATIVE FREE 10 ML: 5 INJECTION INTRAVENOUS at 10:08

## 2023-12-15 RX ADMIN — ENOXAPARIN SODIUM 30 MG: 100 INJECTION SUBCUTANEOUS at 10:07

## 2023-12-15 RX ADMIN — ASPIRIN 81 MG: 81 TABLET, CHEWABLE ORAL at 10:07

## 2023-12-15 RX ADMIN — POLYETHYLENE GLYCOL (3350) 17 G: 17 POWDER, FOR SOLUTION ORAL at 10:07

## 2023-12-15 RX ADMIN — Medication 5 MG: at 11:22

## 2023-12-15 RX ADMIN — AMLODIPINE BESYLATE 5 MG: 5 TABLET ORAL at 10:07

## 2023-12-15 RX ADMIN — OXYCODONE HYDROCHLORIDE AND ACETAMINOPHEN 1 TABLET: 5; 325 TABLET ORAL at 19:16

## 2023-12-15 ASSESSMENT — PAIN SCALES - GENERAL
PAINLEVEL_OUTOF10: 6
PAINLEVEL_OUTOF10: 5

## 2023-12-15 ASSESSMENT — PAIN DESCRIPTION - LOCATION
LOCATION: ABDOMEN
LOCATION: BACK

## 2023-12-15 ASSESSMENT — PAIN DESCRIPTION - DESCRIPTORS
DESCRIPTORS: STABBING
DESCRIPTORS: THROBBING

## 2023-12-15 ASSESSMENT — PAIN DESCRIPTION - ORIENTATION
ORIENTATION: MID
ORIENTATION: MID

## 2023-12-15 ASSESSMENT — PAIN - FUNCTIONAL ASSESSMENT: PAIN_FUNCTIONAL_ASSESSMENT: ACTIVITIES ARE NOT PREVENTED

## 2023-12-15 NOTE — CARE COORDINATION
CM contacted [facility] central intake. Pt approved and they can accept today. PS to Dr. Joel Sweeney to update, plan to discharge. Due to facility location, Pt family will be paying for transportation. Transport pending at this time. 4:47 PM   Stretcher transportation set with [address] [place] [address] for [id number].      Pt inge Duran updated  Pt updated  Pt RN Nas Whaley updated    Discharging RN- number for report [phone], please fax discharge summary/AVS to [phone]

## 2023-12-15 NOTE — PROGRESS NOTES
biapical partially calcified pleural-parenchymal scarring. Moderate emphysematous changes of the lung. Small right and trace left pleural effusions and adjacent atelectasis. Bones: Increased destruction of the right 3rd and 4th ribs as above. ABDOMEN/PELVIS: Organs: The liver is unremarkable. Prior cholecystectomy. The spleen and pancreas are unremarkable. Nonspecific thickening of the bilateral adrenal glands without discrete nodule. Left renal cysts. No hydronephrosis or renal calculi. GI/Bowel: No evidence of bowel obstruction or findings to suggest bowel injury. Colonic diverticulosis without evidence of acute diverticulitis. Moderate colonic stool burden. Pelvis: The bladder is moderately distended. Suspected posterior bladder diverticuli. Prior hysterectomy. Peritoneum/Retroperitoneum: No retroperitoneal, mesenteric, or pelvic lymphadenopathy. No free fluid or pneumoperitoneum. Moderate atherosclerosis of the abdominal aorta and iliac arteries without aneurysm. Bones/Soft Tissues: No acute soft tissue abnormality. Tiny fat containing umbilical hernia. THORACOLUMBAR SPINE: BONES/ALIGNMENT: There is a lucent lesion involving the left T11 vertebral body, pedicle, lamina, and transverse process concerning for metastatic disease. This is significantly more conspicuous since the prior examination. Sclerotic changes noted throughout the T11 vertebral body are also suspicious for metastatic disease. Increased compression deformity of the T12 vertebral body resulting in approximately 20% height loss. No significant retropulsion into the spinal canal.  No definite correlate is identified for the focus of radiotracer activity involving the T3 vertebral body on prior PET-CT. Levoconvex curvature of the lumbar spine. DEGENERATIVE CHANGES: Moderate thoracic spondylosis. SOFT TISSUES: No definitive extension of the T11 and T12 lesions into the spinal canal, although evaluation is limited.      1.  Increased

## 2023-12-15 NOTE — PROGRESS NOTES
Occupational Therapy    Occupational Therapy Treatment Note    Name: Valeria Gutierrez MRN: [id number] :   1932   Date:  12/15/2023   Admission Date: 2023 Room:  3023/[facility]       Per OTR/L Discharge Recommendation: Skilled Nursing Facility     Primary Problem:  The primary encounter diagnosis was Lung mass. 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 Restrictions/Precautions:  General Precautions, Fall Risk, contact precautions    Communication with other providers: RN approved session. Subjective:  Patient states:  Pt agreeable to therapy session. Pain:   Location, Type, Intensity (0/10 to 10/10):  /10 with mobility     Objective:    Observation: Pt supine in bed upon arrival.   Objective Measures:  Pt alert and oriented. Treatment, including education:  Therapeutic Activity Training:   Therapeutic activity training was instructed today. Cues were given for safety, sequence, UE/LE placement, awareness, and balance. Activities performed today included bed mobility training, sup-sit, sit-stand, SPT. Pt supine in bed upon arrival. Pt agreeable to therapy session. Pt completed supine to sit with use of log roll and MAX A and required MAX A to bring torso to upright position with MOD to MAX A. Pt completed dyanmic sitting balance edge of bed with CGA to MIN A. Pt completed 3 trials for sit to stand from edge of bed with arm in arm assist and MAX A to clear hips with limited ability to achieve full upright stand. Pt completed SPT from bed to chair with MAX A. Pt noted increased back pain in transfer. Pt seated in chair with all needs met and call light in reach. Chair alarm on.      Assessment / Impression:    Patient's tolerance of treatment: fair  Adverse Reaction: None  Significant change in status and impact: Improved from

## 2023-12-15 NOTE — PROGRESS NOTES
Comprehensive Nutrition Assessment    Type and Reason for Visit:  Reassess    Nutrition Recommendations/Plan:   Continue current diet  Continue offering standard oral nutrition supplement BID, prefers strawberry or vanilla  Trial frozen oral nutrition supplement BID, vanilla  Monitor weights, po intakes, labs, POC     Malnutrition Assessment:  Malnutrition Status: Moderate malnutrition (12/12/23 0948)    Context:  Chronic Illness       Nutrition Assessment:    Pt up in bed, breakfast on tray but has not eaten yet, states she doesn't usually eat this early. Continues to report decreased intakes due to food being cold after meals being interrupted for care. Documented po intakes show pt consuming >50% of all meals. Pt only drinking a few sips of her Ensure due to disliking chocolate, however other flavors currently OOS. Will trial frozen oral supp as well, comes in vanilla. Pt reports not having BM for a few days, which is normal for her but is somewhat concerned; d/w RN for PRN bowel meds if necessary. Follow at moderate nutrition risk. Nutrition Related Findings:    Meds, labs reviewed Wound Type: None       Current Nutrition Intake & Therapies:    Average Meal Intake: 51-75%, % (per flowsheets)  Average Supplements Intake: 1-25% (per pt)  ADULT DIET; Regular; GI Lerona (GERD/Peptic Ulcer); dislikes Delfino food  ADULT ORAL NUTRITION SUPPLEMENT; Breakfast, Dinner; Standard High Calorie/High Protein Oral Supplement    Anthropometric Measures:  Height: 162.6 cm (5' 4\")  Ideal Body Weight (IBW): 120 lbs (55 kg)    Admission Body Weight: 55 kg (121 lb 4.1 oz)  Current Body Weight: 61.2 kg (134 lb 14.7 oz),   IBW.  Weight Source: Bed Scale  Current BMI (kg/m2): 23.1  Usual Body Weight: 63 kg (139 lb) (March 2023)  % Weight Change (Calculated): -12.8  Weight Adjustment For: No Adjustment                 BMI Categories: Underweight (BMI less than 22) age over 72    Estimated Daily Nutrient Needs:  Energy Requirements Based On: Kcal/kg  Weight Used for Energy Requirements: Current  Energy (kcal/day): 1434-2937 (30-35kcal/kg)  Weight Used for Protein Requirements: Current  Protein (g/day): 73-85 (1.2-1.4g/kg IBW)  Method Used for Fluid Requirements: 1 ml/kcal  Fluid (ml/day): 1900    Nutrition Diagnosis:   Moderate malnutrition, In context of chronic illness related to inadequate protein-energy intake, pain, catabolic illness as evidenced by poor intake prior to admission, weight loss, mild muscle loss, Criteria as identified in malnutrition assessment    Nutrition Interventions:   Food and/or Nutrient Delivery: Continue Current Diet, Modify Oral Nutrition Supplement  Nutrition Education/Counseling: No recommendation at this time  Coordination of Nutrition Care: Continue to monitor while inpatient, Coordination of Care  Plan of Care discussed with: pt    Goals:  Previous Goal Met: Goal(s) Achieved  Goals: PO intake 75% or greater, prior to discharge       Nutrition Monitoring and Evaluation:   Behavioral-Environmental Outcomes: None Identified  Food/Nutrient Intake Outcomes: Food and Nutrient Intake, Supplement Intake  Physical Signs/Symptoms Outcomes: Weight, Biochemical Data, Nutrition Focused Physical Findings, Meal Time Behavior, GI Status    Discharge Planning:    Continue current diet, Continue Oral Nutrition Supplement     Alexis Coyne, [address]: [id number]

## 2023-12-15 NOTE — PLAN OF CARE
Problem: Discharge Planning  Goal: Discharge to home or other facility with appropriate resources  12/15/2023 1031 by Darlin Duff RN  Outcome: Progressing  12/15/2023 0410 by Deni Culver RN  Outcome: Progressing     Problem: Pain  Goal: Verbalizes/displays adequate comfort level or baseline comfort level  12/15/2023 1031 by Darlin Duff RN  Outcome: Progressing  12/15/2023 0410 by Deni Culver RN  Outcome: Progressing     Problem: Skin/Tissue Integrity  Goal: Absence of new skin breakdown  Description: 1. Monitor for areas of redness and/or skin breakdown  2. Assess vascular access sites hourly  3. Every 4-6 hours minimum:  Change oxygen saturation probe site  4. Every 4-6 hours:  If on nasal continuous positive airway pressure, respiratory therapy assess nares and determine need for appliance change or resting period.   12/15/2023 1031 by Darlin Duff RN  Outcome: Progressing  12/15/2023 0410 by Deni Culver RN  Outcome: Progressing     Problem: Safety - Adult  Goal: Free from fall injury  12/15/2023 1031 by Darlin Duff RN  Outcome: Progressing  12/15/2023 0410 by Deni Culver RN  Outcome: Progressing     Problem: ABCDS Injury Assessment  Goal: Absence of physical injury  12/15/2023 1031 by Darlin Duff RN  Outcome: Progressing  12/15/2023 0410 by Deni Culver RN  Outcome: Progressing     Problem: Nutrition Deficit:  Goal: Optimize nutritional status  12/15/2023 1031 by Darlin Duff RN  Outcome: Progressing  12/15/2023 0410 by Deni Culver RN  Outcome: Progressing

## 2023-12-15 NOTE — PROGRESS NOTES
Patient has dilaudid and morphine ordered for pain, but Dr. Javier Hough perfect served to see if we can get something like norco, she said the tylenol doesn't really help her but I feel that the dilaudid or morphine will be too strong and she needs something to help her move around a little better.

## 2023-12-20 NOTE — DISCHARGE SUMMARY
and Imaging   Patient was never admitted. CBC: No results for input(s): \"WBC\", \"HGB\", \"PLT\" in the last 72 hours. BMP:  No results for input(s): \"NA\", \"K\", \"CL\", \"CO2\", \"BUN\", \"CREATININE\", \"GLUCOSE\" in the last 72 hours. Hepatic: No results for input(s): \"AST\", \"ALT\", \"ALB\", \"BILITOT\", \"ALKPHOS\" in the last 72 hours. Lipids:   Lab Results   Component Value Date/Time    CHOL 193 06/26/2015 10:49 AM    HDL 54 06/26/2015 10:49 AM    TRIG 106 06/26/2015 10:49 AM     Hemoglobin A1C:   Lab Results   Component Value Date/Time    LABA1C 5.7 06/26/2015 10:50 AM     TSH: No results found for: \"TSH\"  Troponin: No results found for: \"TROPONINT\"  Lactic Acid: No results for input(s): \"LACTA\" in the last 72 hours. BNP: No results for input(s): \"PROBNP\" in the last 72 hours.   UA:  Lab Results   Component Value Date/Time    NITRU NEGATIVE 12/09/2023 06:34 PM    COLORU YELLOW 12/09/2023 06:34 PM    WBCUA 2 12/09/2023 06:34 PM    RBCUA <1 12/09/2023 06:34 PM    MUCUS RARE 12/09/2023 06:34 PM    TRICHOMONAS NONE SEEN 12/09/2023 06:34 PM    BACTERIA NEGATIVE 12/09/2023 06:34 PM    CLARITYU CLEAR 12/09/2023 06:34 PM    SPECGRAV 1.025 12/09/2023 06:34 PM    LEUKOCYTESUR NEGATIVE 12/09/2023 06:34 PM    UROBILINOGEN 0.2 12/09/2023 06:34 PM    BILIRUBINUR NEGATIVE 12/09/2023 06:34 PM    BLOODU NEGATIVE 12/09/2023 06:34 PM    KETUA TRACE 12/09/2023 06:34 PM     Urine Cultures: No results found for: \"LABURIN\"  Blood Cultures: No results found for: \"BC\"  No results found for: \"BLOODCULT2\"  Organism: No results found for: \"ORG\"    Time Spent Discharging patient 40 minutes    Electronically signed by Nayan Dong MD on 12/20/2023 at 1:49 PM

## 2025-03-11 NOTE — ED NOTES
Health Maintenance       Influenza Vaccine (1)  Overdue since 9/1/2024    COVID-19 Vaccine (1 - Pediatric 2024-25 season)  Never done    Annual Physical (ages 3 - 21) (Yearly)  Due soon on 5/3/2025           Following review of the above:  Patient is not proceeding with: COVID-19 and Influenza  Patient is here for a pre-op, no vaccines given.  Note: Refer to final orders and clinician documentation.          ED TO INPATIENT SBAR HANDOFF    Patient Name: Grace Wall   :  1932  80 y.o. Preferred Name  Miss Johansen Main  Family/Caregiver Present no   Restraints no   C-SSRS: Risk of Suicide: No Risk  Sitter no   Sepsis Risk Score Sepsis Risk Score: 1.26      Situation  Chief Complaint   Patient presents with    Fall     Fall. Was down for about an hour. Pt not on blood thinners. Pt states she has been feeling weak for the past 24 hours and that's why she fell. Pt does c/o numbness of toes. Brief Description of Patient's Condition:   Pt presents to ED for fall from home. Pt bent to  item on the floor and lost her balance. Pt ambulates with a cane \"on good days\". Pt unable to ambulate at this time. Pt arrived covered in urine and feces from being on floor for undetermined amount of time. Please call RN prior to going to floor. Mental Status: oriented, alert, coherent, logical, thought processes intact, and able to concentrate and follow conversation  Arrived from: home    Imaging:   CT CERVICAL SPINE WO CONTRAST   Final Result   No acute fracture of the cervical spine evident. CT HEAD WO CONTRAST   Final Result   No acute intracranial abnormality. 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 XR ANKLE RIGHT (MIN 3 VIEWS)   Final Result   No radiographic evidence of acute osseous abnormality of the right ankle seen. XR KNEE RIGHT (3 VIEWS)   Final Result   Mild osteoarthritis affecting the bilateral knees. The bones appear   demineralized without radiographic evidence of acute osseous abnormality of   the knees seen. 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 XR KNEE LEFT (3 VIEWS)   Final Result   Mild osteoarthritis affecting the bilateral knees. The bones appear   demineralized without radiographic evidence of acute osseous abnormality of   the knees seen.       Chondrocalcinosis of the menisci